# Patient Record
Sex: MALE | Race: OTHER | HISPANIC OR LATINO
[De-identification: names, ages, dates, MRNs, and addresses within clinical notes are randomized per-mention and may not be internally consistent; named-entity substitution may affect disease eponyms.]

---

## 2021-09-08 PROBLEM — Z00.00 ENCOUNTER FOR PREVENTIVE HEALTH EXAMINATION: Status: ACTIVE | Noted: 2021-09-08

## 2021-09-13 ENCOUNTER — APPOINTMENT (OUTPATIENT)
Dept: ORTHOPEDIC SURGERY | Facility: CLINIC | Age: 41
End: 2021-09-13

## 2022-03-21 ENCOUNTER — APPOINTMENT (OUTPATIENT)
Dept: ORTHOPEDIC SURGERY | Facility: CLINIC | Age: 42
End: 2022-03-21
Payer: COMMERCIAL

## 2022-03-21 VITALS
HEART RATE: 59 BPM | WEIGHT: 168 LBS | BODY MASS INDEX: 27 KG/M2 | HEIGHT: 66 IN | DIASTOLIC BLOOD PRESSURE: 80 MMHG | SYSTOLIC BLOOD PRESSURE: 135 MMHG

## 2022-03-21 PROCEDURE — 99205 OFFICE O/P NEW HI 60 MIN: CPT

## 2022-05-09 ENCOUNTER — TRANSCRIPTION ENCOUNTER (OUTPATIENT)
Age: 42
End: 2022-05-09

## 2022-05-09 ENCOUNTER — OUTPATIENT (OUTPATIENT)
Dept: OUTPATIENT SERVICES | Facility: HOSPITAL | Age: 42
LOS: 1 days | End: 2022-05-09
Payer: COMMERCIAL

## 2022-05-09 ENCOUNTER — APPOINTMENT (OUTPATIENT)
Dept: ORTHOPEDIC SURGERY | Facility: CLINIC | Age: 42
End: 2022-05-09
Payer: COMMERCIAL

## 2022-05-09 VITALS
WEIGHT: 169.09 LBS | OXYGEN SATURATION: 99 % | HEIGHT: 67 IN | DIASTOLIC BLOOD PRESSURE: 78 MMHG | SYSTOLIC BLOOD PRESSURE: 119 MMHG | TEMPERATURE: 98 F | HEART RATE: 64 BPM | RESPIRATION RATE: 15 BRPM

## 2022-05-09 DIAGNOSIS — S82.252A DISPLACED COMMINUTED FRACTURE OF SHAFT OF LEFT TIBIA, INITIAL ENCOUNTER FOR CLOSED FRACTURE: ICD-10-CM

## 2022-05-09 DIAGNOSIS — Z87.81 PERSONAL HISTORY OF (HEALED) TRAUMATIC FRACTURE: Chronic | ICD-10-CM

## 2022-05-09 DIAGNOSIS — S82.252K: ICD-10-CM

## 2022-05-09 PROCEDURE — 86901 BLOOD TYPING SEROLOGIC RH(D): CPT

## 2022-05-09 PROCEDURE — 73590 X-RAY EXAM OF LOWER LEG: CPT | Mod: LT

## 2022-05-09 PROCEDURE — 86900 BLOOD TYPING SEROLOGIC ABO: CPT

## 2022-05-09 PROCEDURE — G0463: CPT

## 2022-05-09 PROCEDURE — 99214 OFFICE O/P EST MOD 30 MIN: CPT

## 2022-05-09 PROCEDURE — 86850 RBC ANTIBODY SCREEN: CPT

## 2022-05-09 PROCEDURE — 85027 COMPLETE CBC AUTOMATED: CPT

## 2022-05-09 RX ORDER — CEFAZOLIN SODIUM 1 G
2000 VIAL (EA) INJECTION ONCE
Refills: 0 | Status: DISCONTINUED | OUTPATIENT
Start: 2022-05-10 | End: 2022-05-12

## 2022-05-09 NOTE — H&P PST ADULT - PROBLEM SELECTOR PLAN 1
Removal of hardware left leg  intramedullary nailing left tibia  reconstruction left tibia with autograft

## 2022-05-09 NOTE — H&P PST ADULT - ASSESSMENT
fracture  left tibia   CAPRINI SCORE [CLOT]    AGE RELATED RISK FACTORS                                                       MOBILITY RELATED FACTORS  [ ] Age 41-60 years                                            (1 Point)                  [ ] Bed rest                                                        (1 Point)  [ ] Age: 61-74 years                                           (2 Points)                 [ ] Plaster cast                                                   (2 Points)  [ ] Age= 75 years                                              (3 Points)                 [ ] Bed bound for more than 72 hours                 (2 Points)    DISEASE RELATED RISK FACTORS                                               GENDER SPECIFIC FACTORS  [ ] Edema in the lower extremities                       (1 Point)                  [ ] Pregnancy                                                     (1 Point)  [ ] Varicose veins                                               (1 Point)                  [ ] Post-partum < 6 weeks                                   (1 Point)             [ x] BMI > 25 Kg/m2                                            (1 Point)                  [ ] Hormonal therapy  or oral contraception          (1 Point)                 [ ] Sepsis (in the previous month)                        (1 Point)                  [ ] History of pregnancy complications                 (1 point)  [ ] Pneumonia or serious lung disease                                               [ ] Unexplained or recurrent                     (1 Point)           (in the previous month)                               (1 Point)  [ ] Abnormal pulmonary function test                     (1 Point)                 SURGERY RELATED RISK FACTORS  [ ] Acute myocardial infarction                              (1 Point)                 [ ]  Section                                             (1 Point)  [ ] Congestive heart failure (in the previous month)  (1 Point)               [ ] Minor surgery                                                  (1 Point)   [ ] Inflammatory bowel disease                             (1 Point)                 [ ] Arthroscopic surgery                                        (2 Points)  [ ] Central venous access                                      (2 Points)                [x ] General surgery lasting more than 45 minutes   (2 Points)       [ ] Stroke (in the previous month)                          (5 Points)               [ ] Elective arthroplasty                                         (5 Points)                                                                                                                                               HEMATOLOGY RELATED FACTORS                                                 TRAUMA RELATED RISK FACTORS  [ ] Prior episodes of VTE                                     (3 Points)                [ ] Fracture of the hip, pelvis, or leg                       (5 Points)  [ ] Positive family history for VTE                         (3 Points)                 [ ] Acute spinal cord injury (in the previous month)  (5 Points)  [ ] Prothrombin 23804 A                                     (3 Points)                 [ ] Paralysis  (less than 1 month)                             (5 Points)  [ ] Factor V Leiden                                             (3 Points)                  [ ] Multiple Trauma within 1 month                        (5 Points)  [ ] Lupus anticoagulants                                     (3 Points)                                                           [ ] Anticardiolipin antibodies                               (3 Points)                                                       [ ] High homocysteine in the blood                      (3 Points)                                             [ ] Other congenital or acquired thrombophilia      (3 Points)                                                [ ] Heparin induced thrombocytopenia                  (3 Points)                                          Total Score [   3       ]    Caprini Score 0 - 2:  Low Risk, No VTE Prophylaxis required for most patients, encourage ambulation  Caprini Score 3 - 6:  At Risk, pharmacologic VTE prophylaxis is indicated for most patients (in the absence of a contraindication)  Caprini Score Greater than or = 7:  High Risk, pharmacologic VTE prophylaxis is indicated for most patients (in the absence of a contraindication)

## 2022-05-09 NOTE — H&P PST ADULT - NSANTHOSAYNRD_GEN_A_CORE
No. CYRUS screening performed.  STOP BANG Legend: 0-2 = LOW Risk; 3-4 = INTERMEDIATE Risk; 5-8 = HIGH Risk

## 2022-05-09 NOTE — H&P PST ADULT - FALL HARM RISK - HARM RISK INTERVENTIONS
Communicate Risk of Fall with Harm to all staff/Monitor gait and stability/Reinforce activity limits and safety measures with patient and family/Tailored Fall Risk Interventions/Visual Cue: Yellow wristband and red socks/Bed in lowest position, wheels locked, appropriate side rails in place/Call bell, personal items and telephone in reach/Instruct patient to call for assistance before getting out of bed or chair/Non-slip footwear when patient is out of bed/[place] to call system/Physically safe environment - no spills, clutter or unnecessary equipment/Purposeful Proactive Rounding/Room/bathroom lighting operational, light cord in reach
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
## 2022-05-10 ENCOUNTER — INPATIENT (INPATIENT)
Facility: HOSPITAL | Age: 42
LOS: 1 days | Discharge: HOME | DRG: 493 | End: 2022-05-12
Attending: ORTHOPAEDIC SURGERY | Admitting: ORTHOPAEDIC SURGERY
Payer: COMMERCIAL

## 2022-05-10 ENCOUNTER — APPOINTMENT (OUTPATIENT)
Dept: ORTHOPEDIC SURGERY | Facility: HOSPITAL | Age: 42
End: 2022-05-10

## 2022-05-10 VITALS
DIASTOLIC BLOOD PRESSURE: 80 MMHG | HEIGHT: 67 IN | OXYGEN SATURATION: 98 % | RESPIRATION RATE: 16 BRPM | SYSTOLIC BLOOD PRESSURE: 124 MMHG | HEART RATE: 74 BPM | TEMPERATURE: 97 F | WEIGHT: 315 LBS

## 2022-05-10 DIAGNOSIS — S82.252K: ICD-10-CM

## 2022-05-10 DIAGNOSIS — Z87.81 PERSONAL HISTORY OF (HEALED) TRAUMATIC FRACTURE: Chronic | ICD-10-CM

## 2022-05-10 LAB
ANION GAP SERPL CALC-SCNC: 11 MMOL/L — SIGNIFICANT CHANGE UP (ref 5–17)
BUN SERPL-MCNC: 11 MG/DL — SIGNIFICANT CHANGE UP (ref 7–23)
CALCIUM SERPL-MCNC: 8.7 MG/DL — SIGNIFICANT CHANGE UP (ref 8.4–10.5)
CHLORIDE SERPL-SCNC: 105 MMOL/L — SIGNIFICANT CHANGE UP (ref 96–108)
CO2 SERPL-SCNC: 24 MMOL/L — SIGNIFICANT CHANGE UP (ref 22–31)
CREAT SERPL-MCNC: 1.04 MG/DL — SIGNIFICANT CHANGE UP (ref 0.5–1.3)
EGFR: 94 ML/MIN/1.73M2 — SIGNIFICANT CHANGE UP
GLUCOSE SERPL-MCNC: 139 MG/DL — HIGH (ref 70–99)
GRAM STN FLD: SIGNIFICANT CHANGE UP
GRAM STN FLD: SIGNIFICANT CHANGE UP
HCT VFR BLD CALC: 43.2 % — SIGNIFICANT CHANGE UP (ref 39–50)
HGB BLD-MCNC: 14.6 G/DL — SIGNIFICANT CHANGE UP (ref 13–17)
MCHC RBC-ENTMCNC: 29.9 PG — SIGNIFICANT CHANGE UP (ref 27–34)
MCHC RBC-ENTMCNC: 33.8 GM/DL — SIGNIFICANT CHANGE UP (ref 32–36)
MCV RBC AUTO: 88.3 FL — SIGNIFICANT CHANGE UP (ref 80–100)
NRBC # BLD: 0 /100 WBCS — SIGNIFICANT CHANGE UP (ref 0–0)
PLATELET # BLD AUTO: 229 K/UL — SIGNIFICANT CHANGE UP (ref 150–400)
POTASSIUM SERPL-MCNC: 3.8 MMOL/L — SIGNIFICANT CHANGE UP (ref 3.5–5.3)
POTASSIUM SERPL-SCNC: 3.8 MMOL/L — SIGNIFICANT CHANGE UP (ref 3.5–5.3)
RBC # BLD: 4.89 M/UL — SIGNIFICANT CHANGE UP (ref 4.2–5.8)
RBC # FLD: 12.5 % — SIGNIFICANT CHANGE UP (ref 10.3–14.5)
SARS-COV-2 N GENE NPH QL NAA+PROBE: NOT DETECTED
SODIUM SERPL-SCNC: 140 MMOL/L — SIGNIFICANT CHANGE UP (ref 135–145)
SPECIMEN SOURCE: SIGNIFICANT CHANGE UP
SPECIMEN SOURCE: SIGNIFICANT CHANGE UP
WBC # BLD: 13.3 K/UL — HIGH (ref 3.8–10.5)
WBC # FLD AUTO: 13.3 K/UL — HIGH (ref 3.8–10.5)

## 2022-05-10 PROCEDURE — 27759 TREATMENT OF TIBIA FRACTURE: CPT | Mod: LT

## 2022-05-10 PROCEDURE — 20680 REMOVAL OF IMPLANT DEEP: CPT | Mod: 59,LT

## 2022-05-10 PROCEDURE — 27640 PARTIAL REMOVAL OF TIBIA: CPT | Mod: LT

## 2022-05-10 PROCEDURE — 20702 MNL PREP&INSJ IMED RX DEV: CPT

## 2022-05-10 DEVICE — SCREW LOKG 5X45MM
Type: IMPLANTABLE DEVICE | Site: LEFT | Status: NON-FUNCTIONAL
Removed: 2022-05-10

## 2022-05-10 DEVICE — CEMENT SIMPLEX P 40GM
Type: IMPLANTABLE DEVICE | Site: LEFT | Status: NON-FUNCTIONAL
Removed: 2022-05-10

## 2022-05-10 DEVICE — IMPLANTABLE DEVICE
Type: IMPLANTABLE DEVICE | Site: LEFT | Status: NON-FUNCTIONAL
Removed: 2022-05-10

## 2022-05-10 DEVICE — GUIDEWIRE BALL TIP 3MM X 1000MM FOR T2 R1.5 FEMORAL NAILING SYSTEM
Type: IMPLANTABLE DEVICE | Site: LEFT | Status: NON-FUNCTIONAL
Removed: 2022-05-10

## 2022-05-10 DEVICE — K-WIRE STRYKER 3MM X 285MM
Type: IMPLANTABLE DEVICE | Site: LEFT | Status: NON-FUNCTIONAL
Removed: 2022-05-10

## 2022-05-10 DEVICE — K-WIRE STRYKER SPI .03MM X 285MM
Type: IMPLANTABLE DEVICE | Site: LEFT | Status: NON-FUNCTIONAL
Removed: 2022-05-10

## 2022-05-10 RX ORDER — LIDOCAINE HCL 20 MG/ML
0.2 VIAL (ML) INJECTION ONCE
Refills: 0 | Status: DISCONTINUED | OUTPATIENT
Start: 2022-05-10 | End: 2022-05-10

## 2022-05-10 RX ORDER — ACETAMINOPHEN 500 MG
975 TABLET ORAL EVERY 8 HOURS
Refills: 0 | Status: DISCONTINUED | OUTPATIENT
Start: 2022-05-10 | End: 2022-05-12

## 2022-05-10 RX ORDER — CHLORHEXIDINE GLUCONATE 213 G/1000ML
1 SOLUTION TOPICAL ONCE
Refills: 0 | Status: DISCONTINUED | OUTPATIENT
Start: 2022-05-10 | End: 2022-05-10

## 2022-05-10 RX ORDER — HYDROMORPHONE HYDROCHLORIDE 2 MG/ML
0.5 INJECTION INTRAMUSCULAR; INTRAVENOUS; SUBCUTANEOUS
Refills: 0 | Status: DISCONTINUED | OUTPATIENT
Start: 2022-05-10 | End: 2022-05-10

## 2022-05-10 RX ORDER — MAGNESIUM HYDROXIDE 400 MG/1
30 TABLET, CHEWABLE ORAL DAILY
Refills: 0 | Status: DISCONTINUED | OUTPATIENT
Start: 2022-05-10 | End: 2022-05-12

## 2022-05-10 RX ORDER — CEFAZOLIN SODIUM 1 G
2000 VIAL (EA) INJECTION EVERY 8 HOURS
Refills: 0 | Status: DISCONTINUED | OUTPATIENT
Start: 2022-05-10 | End: 2022-05-12

## 2022-05-10 RX ORDER — CEFAZOLIN SODIUM 1 G
2000 VIAL (EA) INJECTION EVERY 8 HOURS
Refills: 0 | Status: DISCONTINUED | OUTPATIENT
Start: 2022-05-10 | End: 2022-05-10

## 2022-05-10 RX ORDER — OXYCODONE HYDROCHLORIDE 5 MG/1
5 TABLET ORAL
Refills: 0 | Status: DISCONTINUED | OUTPATIENT
Start: 2022-05-10 | End: 2022-05-10

## 2022-05-10 RX ORDER — OXYCODONE HYDROCHLORIDE 5 MG/1
5 TABLET ORAL EVERY 4 HOURS
Refills: 0 | Status: DISCONTINUED | OUTPATIENT
Start: 2022-05-10 | End: 2022-05-12

## 2022-05-10 RX ORDER — POLYETHYLENE GLYCOL 3350 17 G/17G
17 POWDER, FOR SOLUTION ORAL AT BEDTIME
Refills: 0 | Status: DISCONTINUED | OUTPATIENT
Start: 2022-05-10 | End: 2022-05-12

## 2022-05-10 RX ORDER — OXYCODONE HYDROCHLORIDE 5 MG/1
10 TABLET ORAL EVERY 4 HOURS
Refills: 0 | Status: DISCONTINUED | OUTPATIENT
Start: 2022-05-10 | End: 2022-05-12

## 2022-05-10 RX ORDER — SENNA PLUS 8.6 MG/1
2 TABLET ORAL AT BEDTIME
Refills: 0 | Status: DISCONTINUED | OUTPATIENT
Start: 2022-05-10 | End: 2022-05-12

## 2022-05-10 RX ORDER — SODIUM CHLORIDE 9 MG/ML
3 INJECTION INTRAMUSCULAR; INTRAVENOUS; SUBCUTANEOUS EVERY 8 HOURS
Refills: 0 | Status: DISCONTINUED | OUTPATIENT
Start: 2022-05-10 | End: 2022-05-10

## 2022-05-10 RX ORDER — ASPIRIN/CALCIUM CARB/MAGNESIUM 324 MG
325 TABLET ORAL
Refills: 0 | Status: DISCONTINUED | OUTPATIENT
Start: 2022-05-10 | End: 2022-05-12

## 2022-05-10 RX ORDER — SODIUM CHLORIDE 9 MG/ML
1000 INJECTION, SOLUTION INTRAVENOUS
Refills: 0 | Status: DISCONTINUED | OUTPATIENT
Start: 2022-05-10 | End: 2022-05-10

## 2022-05-10 RX ORDER — ONDANSETRON 8 MG/1
4 TABLET, FILM COATED ORAL ONCE
Refills: 0 | Status: DISCONTINUED | OUTPATIENT
Start: 2022-05-10 | End: 2022-05-10

## 2022-05-10 RX ORDER — ONDANSETRON 8 MG/1
4 TABLET, FILM COATED ORAL EVERY 6 HOURS
Refills: 0 | Status: DISCONTINUED | OUTPATIENT
Start: 2022-05-10 | End: 2022-05-12

## 2022-05-10 RX ORDER — OXYCODONE HYDROCHLORIDE 5 MG/1
10 TABLET ORAL
Refills: 0 | Status: DISCONTINUED | OUTPATIENT
Start: 2022-05-10 | End: 2022-05-10

## 2022-05-10 RX ORDER — TRAMADOL HYDROCHLORIDE 50 MG/1
50 TABLET ORAL EVERY 6 HOURS
Refills: 0 | Status: DISCONTINUED | OUTPATIENT
Start: 2022-05-10 | End: 2022-05-12

## 2022-05-10 RX ORDER — KETOROLAC TROMETHAMINE 30 MG/ML
30 SYRINGE (ML) INJECTION EVERY 6 HOURS
Refills: 0 | Status: DISCONTINUED | OUTPATIENT
Start: 2022-05-10 | End: 2022-05-11

## 2022-05-10 RX ORDER — SODIUM CHLORIDE 9 MG/ML
1000 INJECTION, SOLUTION INTRAVENOUS
Refills: 0 | Status: DISCONTINUED | OUTPATIENT
Start: 2022-05-10 | End: 2022-05-12

## 2022-05-10 RX ORDER — PANTOPRAZOLE SODIUM 20 MG/1
40 TABLET, DELAYED RELEASE ORAL
Refills: 0 | Status: DISCONTINUED | OUTPATIENT
Start: 2022-05-10 | End: 2022-05-12

## 2022-05-10 RX ADMIN — Medication 975 MILLIGRAM(S): at 17:10

## 2022-05-10 RX ADMIN — Medication 100 MILLIGRAM(S): at 20:34

## 2022-05-10 RX ADMIN — Medication 325 MILLIGRAM(S): at 17:10

## 2022-05-10 RX ADMIN — HYDROMORPHONE HYDROCHLORIDE 0.5 MILLIGRAM(S): 2 INJECTION INTRAMUSCULAR; INTRAVENOUS; SUBCUTANEOUS at 13:00

## 2022-05-10 RX ADMIN — Medication 1 TABLET(S): at 20:35

## 2022-05-10 RX ADMIN — OXYCODONE HYDROCHLORIDE 10 MILLIGRAM(S): 5 TABLET ORAL at 21:34

## 2022-05-10 RX ADMIN — Medication 975 MILLIGRAM(S): at 17:40

## 2022-05-10 RX ADMIN — OXYCODONE HYDROCHLORIDE 10 MILLIGRAM(S): 5 TABLET ORAL at 20:34

## 2022-05-10 RX ADMIN — Medication 30 MILLIGRAM(S): at 17:11

## 2022-05-10 RX ADMIN — POLYETHYLENE GLYCOL 3350 17 GRAM(S): 17 POWDER, FOR SOLUTION ORAL at 20:35

## 2022-05-10 RX ADMIN — SODIUM CHLORIDE 75 MILLILITER(S): 9 INJECTION, SOLUTION INTRAVENOUS at 13:06

## 2022-05-10 RX ADMIN — Medication 30 MILLIGRAM(S): at 17:40

## 2022-05-10 RX ADMIN — SENNA PLUS 2 TABLET(S): 8.6 TABLET ORAL at 20:35

## 2022-05-10 RX ADMIN — HYDROMORPHONE HYDROCHLORIDE 0.5 MILLIGRAM(S): 2 INJECTION INTRAMUSCULAR; INTRAVENOUS; SUBCUTANEOUS at 13:15

## 2022-05-10 NOTE — CHART NOTE - NSCHARTNOTEFT_GEN_A_CORE
Called by RN that patients dressing was saturated with blood.  Visited patient bedside with family member present.  Patients Heel and posterior left lower extremity mild to moderately saturated with sanguinous drainage. Removed ace bandage, applied new web roll, abdominal pads from the heel up the posterior calf and wrapped in new Ace bandage.  Patient with no compliant and in no acute distress throughout encounter.  Patient and family made aware orthopedic team will continue to monitor dressing daily, patient and family Sinhala speaking.    Gregorio Chávez PA-C  Orthopedic Surgery Team.  Team Pager #[phone]

## 2022-05-10 NOTE — BRIEF OPERATIVE NOTE - NSICDXBRIEFPREOP_GEN_ALL_CORE_FT
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

## 2022-05-10 NOTE — PHYSICAL THERAPY INITIAL EVALUATION ADULT - PLANNED THERAPY INTERVENTIONS, PT EVAL
Stair Training - GOAL: Pt will negotiate one flight of stairs +HR in 2 weeks./balance training/gait training/transfer training

## 2022-05-10 NOTE — PATIENT PROFILE ADULT - FALL HARM RISK - UNIVERSAL INTERVENTIONS
Bed in lowest position, wheels locked, appropriate side rails in place/Call bell, personal items and telephone in reach/Instruct patient to call for assistance before getting out of bed or chair/Non-slip footwear when patient is out of bed/[place] to call system/Physically safe environment - no spills, clutter or unnecessary equipment/Purposeful Proactive Rounding/Room/bathroom lighting operational, light cord in reach

## 2022-05-10 NOTE — BRIEF OPERATIVE NOTE - NSICDXBRIEFPROCEDURE_GEN_ALL_CORE_FT
PROCEDURES:  Sequestrectomy of left tibia 10-May-2022 12:26:48  Nava Salazar  Removal of deeply implanted hardware 10-May-2022 12:26:59  Nava Salazar  Intramedullary nailing of left tibia 10-May-2022 12:27:11 antibiotic coated nail Nava Salazar
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
## 2022-05-10 NOTE — PHYSICAL THERAPY INITIAL EVALUATION ADULT - RANGE OF MOTION EXAMINATION, REHAB EVAL
BUE/BLE PROM WFL/bilateral upper extremity ROM was WFL (within functional limits)/bilateral lower extremity ROM was WFL (within functional limits)

## 2022-05-10 NOTE — CHART NOTE - NSCHARTNOTEFT_GEN_A_CORE
POC    41 year old Moldovan speaking male with history of closed complex fracture of left tibia with nonunion, s/p sequestrectomy of left tibia, removal of implanted hardware, and IMN of left tibia POD#0. Patient was seen laying comfortably in bed, in no acute distress. He states he is comfortable and denies any fever, chills, chest pain, shortness of breath, nausea, vomiting, or any other complaint on review of systems. (DANIELA Chávez present as )     Vital Signs  T(C): 36.4   T(F): 97.5   HR: 77   BP: 129/66  BP(mean): 94   RR: 16   SpO2: 99%     General: Alert and oriented x3, no acute distress  Cardiovascular: RRR, +S1, S2, no murmurs, rubs, or gallops  Pulmonary: Lungs clear to ausculation bilaterally, no wheezes, rales, or rhonchi  Abdomen: Soft, nontender, nondistended, +BS x 4 quadrants  : No butcher in place  Ext:  LLE  Aquacel dressing over left knee, clean, dry, and intact, no drainage noted   Soft dressing over LLE  No calf tenderness or edema   5/5 EHL/FHL  gross sensation intact   DP/PT pulses 2+  Capillary refill <2 seconds    Labs:   N/A    Imaging:   Intraop fluoroscopy s/p removal of hardware, transition to sequestrectomy and IMN ORIF. New hardware in place, no signs of new fracture.     Assessment:   41 year old Moldovan speaking male with history of closed complex fracture of left tibia with nonunion, s/p sequestrectomy of left tibia, removal of implanted hardware, and IMN of left tibia POD#0. Patient is in stable condition.     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 Patient was seen laying comfortably in bed, in no acute distress. He states he is comfortable and denies any fever, chills, chest pain, shortness of breath, nausea, vomiting, or any other complaint on review of systems. (DANIELA Chávez present as )     Vital Signs  T(C): 36.4   T(F): 97.5   HR: 77   BP: 129/66  BP(mean): 94   RR: 16   SpO2: 99%     General: Alert and oriented x3, no acute distress  Cardiovascular: RRR, +S1, S2, no murmurs, rubs, or gallops  Pulmonary: Lungs clear to ausculation bilaterally, no wheezes, rales, or rhonchi  Abdomen: Soft, nontender, nondistended, +BS x 4 quadrants  : No butcher in place  Ext:  LLE  Aquacel dressing over left knee, clean, dry, and intact, no drainage noted   Soft dressing over LLE  No calf tenderness or edema   5/5 EHL/FHL  gross sensation intact throughout digits 1-5  DP/PT pulses 2+  Capillary refill <2 seconds    Labs:                         14.6   13.30 )-----------( 229      ( 10 May 2022 12:40 )             43.2       05-10    140  |  105  |  11  ----------------------------<  139<H>  3.8   |  24  |  1.04            Imaging:   Intra-op fluoroscopy: s/p removal of hardware, transition to sequestrectomy and IMN ORIF. New hardware in place, no signs of new fracture.     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 Patient is in stable condition and in no acute distress.    Plan:   F/U AM Labs tomorrow    Pain control: Tylenol 975 mg PO Q8, Toradol 30 mg IV Q6 x 4 doses, Tramadol 50mg PO Q6h for mild pain, Oxycodone 5 mg PO Q3 PRN for moderate pain, Oxycodone 10 mg PO Q3 PRN for severe pain.   PT: LLE; Weight bearing as tolerated with crutches, ROM knee/ankle as tolerated  DVT prophylaxis: Aspirin 325 mg PO BID   F/U OR cultures  Continue Ancef until OR cultures finalize   Dispo: PACU to floor  Monitor patient daily    Alley SAHU  Orthopedic Service   Beeper [phone]

## 2022-05-10 NOTE — PHYSICAL THERAPY INITIAL EVALUATION ADULT - GENERAL OBSERVATIONS, REHAB EVAL
Received semisupine +IVL, +ace wrap to LLE, VSS. Pt A&OX4, follows 100% of commands. +orthostatic hypotension.

## 2022-05-10 NOTE — BRIEF OPERATIVE NOTE - NSICDXBRIEFPOSTOP_GEN_ALL_CORE_FT
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

## 2022-05-11 ENCOUNTER — TRANSCRIPTION ENCOUNTER (OUTPATIENT)
Age: 42
End: 2022-05-11

## 2022-05-11 LAB
ANION GAP SERPL CALC-SCNC: 10 MMOL/L — SIGNIFICANT CHANGE UP (ref 5–17)
BUN SERPL-MCNC: 18 MG/DL — SIGNIFICANT CHANGE UP (ref 7–23)
CALCIUM SERPL-MCNC: 8.6 MG/DL — SIGNIFICANT CHANGE UP (ref 8.4–10.5)
CHLORIDE SERPL-SCNC: 105 MMOL/L — SIGNIFICANT CHANGE UP (ref 96–108)
CO2 SERPL-SCNC: 25 MMOL/L — SIGNIFICANT CHANGE UP (ref 22–31)
CREAT SERPL-MCNC: 0.93 MG/DL — SIGNIFICANT CHANGE UP (ref 0.5–1.3)
EGFR: 107 ML/MIN/1.73M2 — SIGNIFICANT CHANGE UP
GLUCOSE SERPL-MCNC: 98 MG/DL — SIGNIFICANT CHANGE UP (ref 70–99)
HCT VFR BLD CALC: 33.7 % — LOW (ref 39–50)
HGB BLD-MCNC: 11.4 G/DL — LOW (ref 13–17)
MCHC RBC-ENTMCNC: 29.9 PG — SIGNIFICANT CHANGE UP (ref 27–34)
MCHC RBC-ENTMCNC: 33.8 GM/DL — SIGNIFICANT CHANGE UP (ref 32–36)
MCV RBC AUTO: 88.5 FL — SIGNIFICANT CHANGE UP (ref 80–100)
NRBC # BLD: 0 /100 WBCS — SIGNIFICANT CHANGE UP (ref 0–0)
PLATELET # BLD AUTO: 195 K/UL — SIGNIFICANT CHANGE UP (ref 150–400)
POTASSIUM SERPL-MCNC: 3.6 MMOL/L — SIGNIFICANT CHANGE UP (ref 3.5–5.3)
POTASSIUM SERPL-SCNC: 3.6 MMOL/L — SIGNIFICANT CHANGE UP (ref 3.5–5.3)
RBC # BLD: 3.81 M/UL — LOW (ref 4.2–5.8)
RBC # FLD: 12.5 % — SIGNIFICANT CHANGE UP (ref 10.3–14.5)
SODIUM SERPL-SCNC: 140 MMOL/L — SIGNIFICANT CHANGE UP (ref 135–145)
WBC # BLD: 12.97 K/UL — HIGH (ref 3.8–10.5)
WBC # FLD AUTO: 12.97 K/UL — HIGH (ref 3.8–10.5)

## 2022-05-11 RX ADMIN — Medication 325 MILLIGRAM(S): at 05:01

## 2022-05-11 RX ADMIN — Medication 30 MILLIGRAM(S): at 05:01

## 2022-05-11 RX ADMIN — Medication 975 MILLIGRAM(S): at 00:34

## 2022-05-11 RX ADMIN — POLYETHYLENE GLYCOL 3350 17 GRAM(S): 17 POWDER, FOR SOLUTION ORAL at 20:50

## 2022-05-11 RX ADMIN — Medication 30 MILLIGRAM(S): at 00:34

## 2022-05-11 RX ADMIN — Medication 1 TABLET(S): at 14:10

## 2022-05-11 RX ADMIN — Medication 1 TABLET(S): at 12:12

## 2022-05-11 RX ADMIN — Medication 1 TABLET(S): at 05:01

## 2022-05-11 RX ADMIN — Medication 100 MILLIGRAM(S): at 20:49

## 2022-05-11 RX ADMIN — PANTOPRAZOLE SODIUM 40 MILLIGRAM(S): 20 TABLET, DELAYED RELEASE ORAL at 05:01

## 2022-05-11 RX ADMIN — Medication 1 TABLET(S): at 20:49

## 2022-05-11 RX ADMIN — Medication 975 MILLIGRAM(S): at 09:11

## 2022-05-11 RX ADMIN — Medication 975 MILLIGRAM(S): at 10:11

## 2022-05-11 RX ADMIN — OXYCODONE HYDROCHLORIDE 5 MILLIGRAM(S): 5 TABLET ORAL at 20:49

## 2022-05-11 RX ADMIN — Medication 100 MILLIGRAM(S): at 14:11

## 2022-05-11 RX ADMIN — Medication 30 MILLIGRAM(S): at 12:40

## 2022-05-11 RX ADMIN — Medication 325 MILLIGRAM(S): at 17:39

## 2022-05-11 RX ADMIN — Medication 100 MILLIGRAM(S): at 05:01

## 2022-05-11 RX ADMIN — Medication 975 MILLIGRAM(S): at 18:40

## 2022-05-11 RX ADMIN — OXYCODONE HYDROCHLORIDE 5 MILLIGRAM(S): 5 TABLET ORAL at 21:49

## 2022-05-11 RX ADMIN — Medication 30 MILLIGRAM(S): at 12:12

## 2022-05-11 RX ADMIN — Medication 975 MILLIGRAM(S): at 17:39

## 2022-05-11 NOTE — PROGRESS NOTE ADULT - ATTENDING COMMENTS
s/p Stage 1 nonunion reconstruction.  f/u cx.  If cx remain -ve pt can be dc tomorrow.  OOB. WBAt w crutches

## 2022-05-11 NOTE — DISCHARGE NOTE PROVIDER - NSDCMRMEDTOKEN_GEN_ALL_CORE_FT
acetaminophen 325 mg oral tablet: 3 tab(s) orally every 8 hours x 5 days then as needed  calcium-vitamin D 500 mg-5 mcg (200 intl units) oral tablet: 1 tab(s) orally 3 times a day  Ecotrin 325 mg oral delayed release tablet: 1 tab(s) orally 2 times a day x 6 weeks MDD:2  Multiple Vitamins oral tablet: 1 tab(s) orally once a day  oxyCODONE 5 mg oral tablet: 1 or 2  tab(s) orally every 4 hours, as needed for moderate to severe pain MDD:6  pantoprazole 40 mg oral delayed release tablet: 1 tab(s) orally once a day (before a meal) MDD:1  polyethylene glycol 3350 oral powder for reconstitution: 17 gram(s) orally once a day (at bedtime), As Needed  senna oral tablet: 2 tab(s) orally once a day (at bedtime)  traMADol 50 mg oral tablet: 1 tab(s) orally every 6 hours, As needed, Mild Pain (1 - 3) MDD:4
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
## 2022-05-11 NOTE — OCCUPATIONAL THERAPY INITIAL EVALUATION ADULT - LONG TERM MEMORY, REHAB EVAL
[Normal Breath Sounds] : Normal breath sounds [Normal Heart Sounds] : normal heart sounds [Normal Rate and Rhythm] : normal rate and rhythm [No Rash or Lesion] : No rash or lesion [Alert] : alert [Oriented to Person] : oriented to person [Oriented to Place] : oriented to place [Oriented to Time] : oriented to time [Calm] : calm [de-identified] : Obese in no distress [de-identified] : SARITA CAMERON EOMI [de-identified] : Obese, soft, nontender, nondistended, positive bowel sounds in all four quadrants.  No hernia or masses.  Minimal RLQ tenderness.  Significantly improved from time of hospitalization. [de-identified] : Ambulating without difficulty or assistance intact

## 2022-05-11 NOTE — DISCHARGE NOTE PROVIDER - NSDCACTIVITY_GEN_ALL_CORE
Do not drive or operate machinery/Do not make important decisions/Walking - Indoors allowed/No heavy lifting/straining/Walking - Outdoors allowed Do not drive or operate machinery/Do not make important decisions/Stairs allowed/Walking - Indoors allowed/No heavy lifting/straining/Walking - Outdoors allowed

## 2022-05-11 NOTE — PHYSICAL EXAM
[de-identified] : L tibia \par Incisions CDI healed no erythema drainage or induration \par SILT TN SPN DPN SN TTP over medial tibial shaft \par Knee ROM 0-135 degrees \par ankle ROM 10 dorsiflexion to 20 degrees plantarflexion \par 30 inversion 5 degrees eversion \par 4/5 strength quads TA PT GS EHL FHL peroneals \par NVI distally 2+ distal pulses  [de-identified] : CT scan 2/22/22 reviewed- There is minimal bone formation medial fracture site with continued persistent fracture lines. There is some callus formation laterally. There is a healed fibula fracture \par 3 views L ankle 2 views left tibia taken today and reviewed by me- There  continues to be some callus formation on 2/4 corticies of the tibia. There is a healed fibula fracture with callus formation.  There has been minimal to no progression of callus compared to prior radiographs with persistent fracture lines.

## 2022-05-11 NOTE — DISCHARGE NOTE PROVIDER - NSDCCPCAREPLAN_GEN_ALL_CORE_FT
PRINCIPAL DISCHARGE DIAGNOSIS  Diagnosis: Displaced comminuted fracture of shaft of left tibia  Assessment and Plan of Treatment:

## 2022-05-11 NOTE — REASON FOR VISIT
[Follow-Up Visit] : a follow-up visit for [Time Spent: ____ minutes] : Total time spent using  services: [unfilled] minutes. The patient's primary language is not English thus required  services. [FreeTextEntry2] : left tibia fracture [Interpreters_IDNumber] : [id number] [Interpreters_FullName] : Colin [TWNoteComboBox1] : Sudanese

## 2022-05-11 NOTE — DISCHARGE NOTE PROVIDER - CARE PROVIDER_API CALL
Reese Leary (MD)  Orthopaedic Surgery  [address]  [place] [address]  Phone: [phone]  Fax: [phone]  Follow Up Time:

## 2022-05-11 NOTE — OCCUPATIONAL THERAPY INITIAL EVALUATION ADULT - PERTINENT HX OF CURRENT PROBLEM, REHAB EVAL
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

## 2022-05-11 NOTE — DISCHARGE NOTE PROVIDER - NSDCCPTREATMENT_GEN_ALL_CORE_FT
PRINCIPAL PROCEDURE  Procedure: Removal of deeply implanted hardware  Findings and Treatment:       SECONDARY PROCEDURE  Procedure: Sequestrectomy of left tibia  Findings and Treatment:
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
## 2022-05-11 NOTE — DISCHARGE NOTE PROVIDER - NSDCFUADDINST_GEN_ALL_CORE_FT
Continue weight bearing as tolerated protected ambulation, keep dressings clean and dry, follow up with Dr. Leary in his office post operative day #14 (5/24/2022) for wound check and suture removal.
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
## 2022-05-12 ENCOUNTER — TRANSCRIPTION ENCOUNTER (OUTPATIENT)
Age: 42
End: 2022-05-12

## 2022-05-12 VITALS
TEMPERATURE: 98 F | SYSTOLIC BLOOD PRESSURE: 113 MMHG | HEART RATE: 81 BPM | RESPIRATION RATE: 18 BRPM | DIASTOLIC BLOOD PRESSURE: 70 MMHG | OXYGEN SATURATION: 96 %

## 2022-05-12 PROCEDURE — 87070 CULTURE OTHR SPECIMN AEROBIC: CPT

## 2022-05-12 PROCEDURE — C1713: CPT

## 2022-05-12 PROCEDURE — 80048 BASIC METABOLIC PNL TOTAL CA: CPT

## 2022-05-12 PROCEDURE — 97116 GAIT TRAINING THERAPY: CPT

## 2022-05-12 PROCEDURE — 97530 THERAPEUTIC ACTIVITIES: CPT

## 2022-05-12 PROCEDURE — 85027 COMPLETE CBC AUTOMATED: CPT

## 2022-05-12 PROCEDURE — C1769: CPT

## 2022-05-12 PROCEDURE — C9399: CPT

## 2022-05-12 PROCEDURE — 76000 FLUOROSCOPY <1 HR PHYS/QHP: CPT

## 2022-05-12 PROCEDURE — 97161 PT EVAL LOW COMPLEX 20 MIN: CPT

## 2022-05-12 PROCEDURE — 36415 COLL VENOUS BLD VENIPUNCTURE: CPT

## 2022-05-12 PROCEDURE — 87075 CULTR BACTERIA EXCEPT BLOOD: CPT

## 2022-05-12 PROCEDURE — 97165 OT EVAL LOW COMPLEX 30 MIN: CPT

## 2022-05-12 RX ORDER — POLYETHYLENE GLYCOL 3350 17 G/17G
17 POWDER, FOR SOLUTION ORAL
Qty: 0 | Refills: 0 | DISCHARGE
Start: 2022-05-12

## 2022-05-12 RX ORDER — TRAMADOL HYDROCHLORIDE 50 MG/1
1 TABLET ORAL
Qty: 28 | Refills: 0
Start: 2022-05-12 | End: 2022-05-18

## 2022-05-12 RX ORDER — SENNA PLUS 8.6 MG/1
2 TABLET ORAL
Qty: 0 | Refills: 0 | DISCHARGE
Start: 2022-05-12

## 2022-05-12 RX ORDER — OXYCODONE HYDROCHLORIDE 5 MG/1
1 TABLET ORAL
Qty: 40 | Refills: 0
Start: 2022-05-12

## 2022-05-12 RX ORDER — ACETAMINOPHEN 500 MG
3 TABLET ORAL
Qty: 0 | Refills: 0 | DISCHARGE
Start: 2022-05-12

## 2022-05-12 RX ORDER — PANTOPRAZOLE SODIUM 20 MG/1
1 TABLET, DELAYED RELEASE ORAL
Qty: 30 | Refills: 0
Start: 2022-05-12 | End: 2022-06-10

## 2022-05-12 RX ORDER — ASPIRIN/CALCIUM CARB/MAGNESIUM 324 MG
1 TABLET ORAL
Qty: 82 | Refills: 0
Start: 2022-05-12 | End: 2022-06-21

## 2022-05-12 RX ADMIN — Medication 100 MILLIGRAM(S): at 05:54

## 2022-05-12 RX ADMIN — OXYCODONE HYDROCHLORIDE 5 MILLIGRAM(S): 5 TABLET ORAL at 06:55

## 2022-05-12 RX ADMIN — OXYCODONE HYDROCHLORIDE 5 MILLIGRAM(S): 5 TABLET ORAL at 05:55

## 2022-05-12 RX ADMIN — Medication 975 MILLIGRAM(S): at 08:22

## 2022-05-12 RX ADMIN — Medication 1 TABLET(S): at 11:31

## 2022-05-12 RX ADMIN — Medication 1 TABLET(S): at 05:53

## 2022-05-12 RX ADMIN — PANTOPRAZOLE SODIUM 40 MILLIGRAM(S): 20 TABLET, DELAYED RELEASE ORAL at 05:53

## 2022-05-12 RX ADMIN — Medication 325 MILLIGRAM(S): at 05:54

## 2022-05-12 NOTE — PROGRESS NOTE ADULT - ASSESSMENT
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

## 2022-05-12 NOTE — DISCHARGE NOTE NURSING/CASE MANAGEMENT/SOCIAL WORK - PATIENT PORTAL LINK FT
You can access the FollowMyHealth Patient Portal offered by [facility] by registering at the following website: [email or web address]. By joining [facility]’s FollowMyHealth portal, you will also be able to view your health information using other applications (apps) compatible with our system.

## 2022-05-12 NOTE — DISCHARGE NOTE NURSING/CASE MANAGEMENT/SOCIAL WORK - NSDCPEFALRISK_GEN_ALL_CORE
For information on Fall & Injury Prevention, visit: [email or web address] OR  [email or web address] OR  https://www.cdc.gov/steadi/patient.html

## 2022-05-12 NOTE — PROGRESS NOTE ADULT - ATTENDING COMMENTS
s/p Stage 1 nonunion reconstruction.  f/u cx.  If cx remain -ve pt can be dc tomorrow.  OOB. WBAt w crutches s/p Stage 1 nonunion reconstruction.  cx continue to be -ve.  DC home

## 2022-05-12 NOTE — PROGRESS NOTE ADULT - SUBJECTIVE AND OBJECTIVE BOX
ORTHO ATTENDING POST OP    s/p SALVATORE,  IM nail L  tibia with antibiotic coated nail  WBAT L LE w crutches  ROM knee/ankle as tolerated  Ancef 1g standing until cx return   BID  venodynes  CBC in RR and AM  f/u cx x 4  PT consult- OOB  elevation  keep patient admitted until 5/12 pending cx results.  f/u 2 weeks  
Orthopedic Surgery Progress Note  S: Pain is well controlled.  No events overnight. Denies numbness/tingling. Patient appears very comfortable this morning.    O:  Vital Signs Last 24 Hrs  T(C): 36.6 (11 May 2022 04:20), Max: 36.7 (10 May 2022 14:46)  T(F): 97.9 (11 May 2022 04:20), Max: 98.1 (10 May 2022 14:46)  HR: 66 (11 May 2022 04:20) (54 - 97)  BP: 102/61 (11 May 2022 04:20) (102/61 - 144/71)  BP(mean): 94 (10 May 2022 14:15) (86 - 101)  RR: 18 (11 May 2022 04:20) (12 - 18)  SpO2: 98% (11 May 2022 04:20) (95% - 100%)    Gen: NAD  LLE  Dressings clean, dry, intact  fires EHL/TA/GS  no pain with passive dorsiflexion or plantarflexion  SILT at toes  WWP distally with brisk cap refill                        14.6   13.30 )-----------( 229      ( 10 May 2022 12:40 )             43.2                         15.6   8.20  )-----------( 261      ( 09 May 2022 18:28 )             47.2     05-10    140  |  105  |  11  ----------------------------<  139<H>  3.8   |  24  |  1.04      Assessment:   41 year old male with history of 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
Patient seen and examined at bedside, resting comfortably. Pain well controlled. Denies headache/dizziness/lightheadedness, chest pain, dyspnea, numbness/tingling. No complaints at this time. No overnight events.      LABS:                        11.4   12.97 )-----------( 195      ( 11 May 2022 06:49 )             33.7     05-11    140  |  105  |  18  ----------------------------<  98  3.6   |  25  |  0.93    Ca    8.6      11 May 2022 06:48            VITAL SIGNS:  T(C): 36.8 (05-12-22 @ 04:05), Max: 37.1 (05-11-22 @ 23:35)  HR: 69 (05-12-22 @ 04:05) (59 - 85)  BP: 105/67 (05-12-22 @ 04:05) (102/58 - 123/71)  RR: 18 (05-12-22 @ 04:05) (18 - 18)  SpO2: 97% (05-12-22 @ 04:05) (97% - 100%)      Gen: NAD  LLE  Dressings clean, dry, intact  fires EHL/TA/GS/FHL  No pain with passive dorsiflexion or plantarflexion  SILT at toes  WWP distally with brisk cap refill

## 2022-05-14 PROBLEM — S82.252A DISPLACED COMMINUTED FRACTURE OF SHAFT OF LEFT TIBIA, INITIAL ENCOUNTER FOR CLOSED FRACTURE: Chronic | Status: ACTIVE | Noted: 2022-05-09

## 2022-05-14 PROBLEM — Z78.9 OTHER SPECIFIED HEALTH STATUS: Chronic | Status: ACTIVE | Noted: 2022-05-09

## 2022-05-15 LAB
CULTURE RESULTS: SIGNIFICANT CHANGE UP
SPECIMEN SOURCE: SIGNIFICANT CHANGE UP

## 2022-05-23 ENCOUNTER — APPOINTMENT (OUTPATIENT)
Dept: ORTHOPEDIC SURGERY | Facility: CLINIC | Age: 42
End: 2022-05-23
Payer: COMMERCIAL

## 2022-05-23 VITALS
SYSTOLIC BLOOD PRESSURE: 123 MMHG | BODY MASS INDEX: 28.28 KG/M2 | WEIGHT: 176 LBS | HEART RATE: 73 BPM | DIASTOLIC BLOOD PRESSURE: 71 MMHG | HEIGHT: 66 IN

## 2022-05-23 PROCEDURE — 99024 POSTOP FOLLOW-UP VISIT: CPT

## 2022-05-26 ENCOUNTER — OUTPATIENT (OUTPATIENT)
Dept: OUTPATIENT SERVICES | Facility: HOSPITAL | Age: 42
LOS: 1 days | End: 2022-05-26
Payer: COMMERCIAL

## 2022-05-26 VITALS
RESPIRATION RATE: 14 BRPM | TEMPERATURE: 97 F | OXYGEN SATURATION: 96 % | HEIGHT: 66 IN | DIASTOLIC BLOOD PRESSURE: 75 MMHG | HEART RATE: 69 BPM | SYSTOLIC BLOOD PRESSURE: 116 MMHG | WEIGHT: 166.01 LBS

## 2022-05-26 DIAGNOSIS — Z01.818 ENCOUNTER FOR OTHER PREPROCEDURAL EXAMINATION: ICD-10-CM

## 2022-05-26 DIAGNOSIS — Z29.9 ENCOUNTER FOR PROPHYLACTIC MEASURES, UNSPECIFIED: ICD-10-CM

## 2022-05-26 DIAGNOSIS — S82.252A DISPLACED COMMINUTED FRACTURE OF SHAFT OF LEFT TIBIA, INITIAL ENCOUNTER FOR CLOSED FRACTURE: ICD-10-CM

## 2022-05-26 DIAGNOSIS — S82.252K: ICD-10-CM

## 2022-05-26 DIAGNOSIS — Z87.81 PERSONAL HISTORY OF (HEALED) TRAUMATIC FRACTURE: Chronic | ICD-10-CM

## 2022-05-26 LAB
ANION GAP SERPL CALC-SCNC: 12 MMOL/L — SIGNIFICANT CHANGE UP (ref 5–17)
BUN SERPL-MCNC: 13 MG/DL — SIGNIFICANT CHANGE UP (ref 7–23)
CALCIUM SERPL-MCNC: 9.6 MG/DL — SIGNIFICANT CHANGE UP (ref 8.4–10.5)
CHLORIDE SERPL-SCNC: 104 MMOL/L — SIGNIFICANT CHANGE UP (ref 96–108)
CO2 SERPL-SCNC: 27 MMOL/L — SIGNIFICANT CHANGE UP (ref 22–31)
CREAT SERPL-MCNC: 1.03 MG/DL — SIGNIFICANT CHANGE UP (ref 0.5–1.3)
EGFR: 95 ML/MIN/1.73M2 — SIGNIFICANT CHANGE UP
GLUCOSE SERPL-MCNC: 85 MG/DL — SIGNIFICANT CHANGE UP (ref 70–99)
HCT VFR BLD CALC: 39.3 % — SIGNIFICANT CHANGE UP (ref 39–50)
HGB BLD-MCNC: 13 G/DL — SIGNIFICANT CHANGE UP (ref 13–17)
MCHC RBC-ENTMCNC: 29.6 PG — SIGNIFICANT CHANGE UP (ref 27–34)
MCHC RBC-ENTMCNC: 33.1 GM/DL — SIGNIFICANT CHANGE UP (ref 32–36)
MCV RBC AUTO: 89.5 FL — SIGNIFICANT CHANGE UP (ref 80–100)
NRBC # BLD: 0 /100 WBCS — SIGNIFICANT CHANGE UP (ref 0–0)
PLATELET # BLD AUTO: 404 K/UL — HIGH (ref 150–400)
POTASSIUM SERPL-MCNC: 3.7 MMOL/L — SIGNIFICANT CHANGE UP (ref 3.5–5.3)
POTASSIUM SERPL-SCNC: 3.7 MMOL/L — SIGNIFICANT CHANGE UP (ref 3.5–5.3)
RBC # BLD: 4.39 M/UL — SIGNIFICANT CHANGE UP (ref 4.2–5.8)
RBC # FLD: 12.3 % — SIGNIFICANT CHANGE UP (ref 10.3–14.5)
SODIUM SERPL-SCNC: 143 MMOL/L — SIGNIFICANT CHANGE UP (ref 135–145)
WBC # BLD: 7.41 K/UL — SIGNIFICANT CHANGE UP (ref 3.8–10.5)
WBC # FLD AUTO: 7.41 K/UL — SIGNIFICANT CHANGE UP (ref 3.8–10.5)

## 2022-05-26 PROCEDURE — 80048 BASIC METABOLIC PNL TOTAL CA: CPT

## 2022-05-26 PROCEDURE — 85027 COMPLETE CBC AUTOMATED: CPT

## 2022-05-26 PROCEDURE — G0463: CPT

## 2022-05-26 RX ORDER — CEFAZOLIN SODIUM 1 G
2000 VIAL (EA) INJECTION ONCE
Refills: 0 | Status: DISCONTINUED | OUTPATIENT
Start: 2022-05-31 | End: 2022-05-31
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
## 2022-05-26 NOTE — H&P PST ADULT - PROBLEM SELECTOR PLAN 1
Removal of hardware left leg  intramedullary nailing left tibia  reconstruction left tibia with autograft Removal of intramedullary nailing left tibia  removal of ABX spacer  reconstruction left tibia nonunion  Intramedullary fixation left tibia
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
## 2022-05-26 NOTE — H&P PST ADULT - FALL HARM RISK - HARM RISK INTERVENTIONS

## 2022-05-26 NOTE — H&P PST ADULT - ASSESSMENT
fracture  left tibia   CAPRINI SCORE [CLOT]    AGE RELATED RISK FACTORS                                                       MOBILITY RELATED FACTORS  [ ] Age 41-60 years                                            (1 Point)                  [ ] Bed rest                                                        (1 Point)  [ ] Age: 61-74 years                                           (2 Points)                 [ ] Plaster cast                                                   (2 Points)  [ ] Age= 75 years                                              (3 Points)                 [ ] Bed bound for more than 72 hours                 (2 Points)    DISEASE RELATED RISK FACTORS                                               GENDER SPECIFIC FACTORS  [ ] Edema in the lower extremities                       (1 Point)                  [ ] Pregnancy                                                     (1 Point)  [ ] Varicose veins                                               (1 Point)                  [ ] Post-partum < 6 weeks                                   (1 Point)             [ x] BMI > 25 Kg/m2                                            (1 Point)                  [ ] Hormonal therapy  or oral contraception          (1 Point)                 [ ] Sepsis (in the previous month)                        (1 Point)                  [ ] History of pregnancy complications                 (1 point)  [ ] Pneumonia or serious lung disease                                               [ ] Unexplained or recurrent                     (1 Point)           (in the previous month)                               (1 Point)  [ ] Abnormal pulmonary function test                     (1 Point)                 SURGERY RELATED RISK FACTORS  [ ] Acute myocardial infarction                              (1 Point)                 [ ]  Section                                             (1 Point)  [ ] Congestive heart failure (in the previous month)  (1 Point)               [ ] Minor surgery                                                  (1 Point)   [ ] Inflammatory bowel disease                             (1 Point)                 [ ] Arthroscopic surgery                                        (2 Points)  [ ] Central venous access                                      (2 Points)                [x ] General surgery lasting more than 45 minutes   (2 Points)       [ ] Stroke (in the previous month)                          (5 Points)               [ ] Elective arthroplasty                                         (5 Points)                                                                                                                                               HEMATOLOGY RELATED FACTORS                                                 TRAUMA RELATED RISK FACTORS  [ ] Prior episodes of VTE                                     (3 Points)                [ ] Fracture of the hip, pelvis, or leg                       (5 Points)  [ ] Positive family history for VTE                         (3 Points)                 [ ] Acute spinal cord injury (in the previous month)  (5 Points)  [ ] Prothrombin 36681 A                                     (3 Points)                 [ ] Paralysis  (less than 1 month)                             (5 Points)  [ ] Factor V Leiden                                             (3 Points)                  [ ] Multiple Trauma within 1 month                        (5 Points)  [ ] Lupus anticoagulants                                     (3 Points)                                                           [ ] Anticardiolipin antibodies                               (3 Points)                                                       [ ] High homocysteine in the blood                      (3 Points)                                             [ ] Other congenital or acquired thrombophilia      (3 Points)                                                [ ] Heparin induced thrombocytopenia                  (3 Points)                                          Total Score [   3       ]    Caprini Score 0 - 2:  Low Risk, No VTE Prophylaxis required for most patients, encourage ambulation  Caprini Score 3 - 6:  At Risk, pharmacologic VTE prophylaxis is indicated for most patients (in the absence of a contraindication)  Caprini Score Greater than or = 7:  High Risk, pharmacologic VTE prophylaxis is indicated for most patients (in the absence of a contraindication)

## 2022-05-26 NOTE — H&P PST ADULT - ATTENDING COMMENTS
L tibia nonunion.  For staged reconstruction.  SALVATORE and sequestrectomy today.  F/u cx post op For repair L tibia non union w autograft and alograft.  r/b/a discussed

## 2022-05-26 NOTE — H&P PST ADULT - MS GEN HX ROS MEA POS PC
arthritis/joint pain/stiffness/leg pain L mild left shin pain, does not take pain medications/arthritis/joint pain/stiffness/leg pain L

## 2022-05-27 NOTE — HISTORY OF PRESENT ILLNESS
[de-identified] : S/P removal of hardware, sequestrectomy and placement of antibiotic intramedullary nail left tibia 5/10/22 [de-identified] : 32 yo M who [place] open [place] fracture [place] [facility] [place] June [place]. prior the procedure it was explained to the patient that this would be a staged procedure. He is now  S/P removal of hardware, sequestrectomy and placement of antibiotic intramedullary nail left tibia 5/10/22. He presents today for first post op followup. Cultures at the time of surgery were negative for infection. He is doing well post operatively.  [de-identified] : LT tibia \par Incisions CDI healing well with sutures in place no erythema drainage or induration \par SILT TN SPN DPN SN \par able to move knee ankle foot and toes \par NVI distally  [de-identified] : none  [de-identified] : S/P removal of hardware, sequestrectomy and placement of antibiotic intramedullary nail left tibia 5/10/22\par - cultures negative \par - cleared to undergo reconstruction of his tibia nonunion  [de-identified] : S/P removal of hardware, sequestrectomy and placement of antibiotic intramedullary nail left tibia 5/10/22\par - cultures were negative \par We will plan for stage two removal of hardware and reconstruction of tibia nonunion with auto/allograft. \par Benefits and alternatives of the procedure discussed with the patient in detail. Risks including but not limited to bleeding, infection, damage to nerves, damage to tissues, damage to blood vessels, persistent nonunion, malunion, post surgical stiffness, need for future surgery, DVT, PE, loss of limb and loss of life were explained.  Benefits of surgery include anatomic restoration  of length alignment and rotation, healing of nonunion, and best chance for better long term functional outcomes. The patient understands the risks and benefits and wishes to undergo surgery. He will be scheduled appropriately.\par

## 2022-05-28 ENCOUNTER — OUTPATIENT (OUTPATIENT)
Dept: OUTPATIENT SERVICES | Facility: HOSPITAL | Age: 42
LOS: 1 days | End: 2022-05-28
Payer: COMMERCIAL

## 2022-05-28 DIAGNOSIS — Z11.52 ENCOUNTER FOR SCREENING FOR COVID-19: ICD-10-CM

## 2022-05-28 DIAGNOSIS — Z87.81 PERSONAL HISTORY OF (HEALED) TRAUMATIC FRACTURE: Chronic | ICD-10-CM

## 2022-05-28 LAB — SARS-COV-2 RNA SPEC QL NAA+PROBE: SIGNIFICANT CHANGE UP

## 2022-05-28 PROCEDURE — C9803: CPT

## 2022-05-28 PROCEDURE — U0005: CPT

## 2022-05-28 PROCEDURE — U0003: CPT

## 2022-05-30 ENCOUNTER — TRANSCRIPTION ENCOUNTER (OUTPATIENT)
Age: 42
End: 2022-05-30

## 2022-05-31 ENCOUNTER — INPATIENT (INPATIENT)
Facility: HOSPITAL | Age: 42
LOS: 0 days | Discharge: HOME | DRG: 494 | End: 2022-05-31
Attending: ORTHOPAEDIC SURGERY | Admitting: ORTHOPAEDIC SURGERY
Payer: COMMERCIAL

## 2022-05-31 ENCOUNTER — APPOINTMENT (OUTPATIENT)
Dept: ORTHOPEDIC SURGERY | Facility: HOSPITAL | Age: 42
End: 2022-05-31

## 2022-05-31 ENCOUNTER — TRANSCRIPTION ENCOUNTER (OUTPATIENT)
Age: 42
End: 2022-05-31

## 2022-05-31 VITALS
DIASTOLIC BLOOD PRESSURE: 71 MMHG | OXYGEN SATURATION: 98 % | RESPIRATION RATE: 18 BRPM | SYSTOLIC BLOOD PRESSURE: 106 MMHG | HEIGHT: 66 IN | HEART RATE: 69 BPM | WEIGHT: 166.01 LBS | TEMPERATURE: 98 F

## 2022-05-31 VITALS
SYSTOLIC BLOOD PRESSURE: 100 MMHG | HEART RATE: 75 BPM | OXYGEN SATURATION: 100 % | TEMPERATURE: 98 F | RESPIRATION RATE: 16 BRPM | DIASTOLIC BLOOD PRESSURE: 75 MMHG

## 2022-05-31 DIAGNOSIS — Z87.81 PERSONAL HISTORY OF (HEALED) TRAUMATIC FRACTURE: Chronic | ICD-10-CM

## 2022-05-31 DIAGNOSIS — S82.252K: ICD-10-CM

## 2022-05-31 LAB
BLD GP AB SCN SERPL QL: NEGATIVE — SIGNIFICANT CHANGE UP
RH IG SCN BLD-IMP: POSITIVE — SIGNIFICANT CHANGE UP

## 2022-05-31 PROCEDURE — 97161 PT EVAL LOW COMPLEX 20 MIN: CPT

## 2022-05-31 PROCEDURE — C1713: CPT

## 2022-05-31 PROCEDURE — C9399: CPT

## 2022-05-31 PROCEDURE — 27759 TREATMENT OF TIBIA FRACTURE: CPT | Mod: LT,58,59

## 2022-05-31 PROCEDURE — C1769: CPT

## 2022-05-31 PROCEDURE — 86850 RBC ANTIBODY SCREEN: CPT

## 2022-05-31 PROCEDURE — 76000 FLUOROSCOPY <1 HR PHYS/QHP: CPT

## 2022-05-31 PROCEDURE — 27724 REPAIR/GRAFT OF TIBIA: CPT | Mod: LT,58

## 2022-05-31 PROCEDURE — C1889: CPT

## 2022-05-31 PROCEDURE — 86901 BLOOD TYPING SEROLOGIC RH(D): CPT

## 2022-05-31 PROCEDURE — 86900 BLOOD TYPING SEROLOGIC ABO: CPT

## 2022-05-31 DEVICE — SCREW LOCKING STRL 5X40MM
Type: IMPLANTABLE DEVICE | Site: LEFT | Status: NON-FUNCTIONAL
Removed: 2022-05-31

## 2022-05-31 DEVICE — SCREW LOKG 5X45MM STRL
Type: IMPLANTABLE DEVICE | Site: LEFT | Status: NON-FUNCTIONAL
Removed: 2022-05-31

## 2022-05-31 DEVICE — SCREW LOKG 5X47.5MM
Type: IMPLANTABLE DEVICE | Site: LEFT | Status: NON-FUNCTIONAL
Removed: 2022-05-31

## 2022-05-31 DEVICE — SCREW LOKG 5X55MM STRL
Type: IMPLANTABLE DEVICE | Site: LEFT | Status: NON-FUNCTIONAL
Removed: 2022-05-31

## 2022-05-31 DEVICE — K-WIRE STRYKER SPI .03MM X 285MM
Type: IMPLANTABLE DEVICE | Site: LEFT | Status: NON-FUNCTIONAL
Removed: 2022-05-31

## 2022-05-31 DEVICE — IMPLANTABLE DEVICE
Type: IMPLANTABLE DEVICE | Site: LEFT | Status: NON-FUNCTIONAL
Removed: 2022-05-31

## 2022-05-31 DEVICE — GUIDEWIRE BALL TIP 3MM X 1000MM FOR T2 R1.5 FEMORAL NAILING SYSTEM
Type: IMPLANTABLE DEVICE | Site: LEFT | Status: NON-FUNCTIONAL
Removed: 2022-05-31

## 2022-05-31 DEVICE — SCREW LOKG 5X57MM STRL
Type: IMPLANTABLE DEVICE | Site: LEFT | Status: NON-FUNCTIONAL
Removed: 2022-05-31

## 2022-05-31 RX ORDER — OXYCODONE HYDROCHLORIDE 5 MG/1
10 TABLET ORAL EVERY 4 HOURS
Refills: 0 | Status: DISCONTINUED | OUTPATIENT
Start: 2022-05-31 | End: 2022-05-31

## 2022-05-31 RX ORDER — ACETAMINOPHEN 500 MG
975 TABLET ORAL EVERY 8 HOURS
Refills: 0 | Status: DISCONTINUED | OUTPATIENT
Start: 2022-05-31 | End: 2022-05-31

## 2022-05-31 RX ORDER — ASPIRIN/CALCIUM CARB/MAGNESIUM 324 MG
325 TABLET ORAL
Refills: 0 | Status: DISCONTINUED | OUTPATIENT
Start: 2022-05-31 | End: 2022-05-31

## 2022-05-31 RX ORDER — ONDANSETRON 8 MG/1
4 TABLET, FILM COATED ORAL EVERY 6 HOURS
Refills: 0 | Status: DISCONTINUED | OUTPATIENT
Start: 2022-05-31 | End: 2022-05-31

## 2022-05-31 RX ORDER — LANOLIN ALCOHOL/MO/W.PET/CERES
3 CREAM (GRAM) TOPICAL AT BEDTIME
Refills: 0 | Status: DISCONTINUED | OUTPATIENT
Start: 2022-05-31 | End: 2022-05-31

## 2022-05-31 RX ORDER — DOCUSATE SODIUM 100 MG
1 CAPSULE ORAL
Qty: 14 | Refills: 0
Start: 2022-05-31 | End: 2022-06-06

## 2022-05-31 RX ORDER — SODIUM CHLORIDE 9 MG/ML
1000 INJECTION INTRAMUSCULAR; INTRAVENOUS; SUBCUTANEOUS
Refills: 0 | Status: DISCONTINUED | OUTPATIENT
Start: 2022-05-31 | End: 2022-05-31

## 2022-05-31 RX ORDER — CEFAZOLIN SODIUM 1 G
2000 VIAL (EA) INJECTION EVERY 8 HOURS
Refills: 0 | Status: DISCONTINUED | OUTPATIENT
Start: 2022-05-31 | End: 2022-05-31

## 2022-05-31 RX ORDER — SENNA PLUS 8.6 MG/1
2 TABLET ORAL
Qty: 28 | Refills: 0
Start: 2022-05-31 | End: 2022-06-13

## 2022-05-31 RX ORDER — POLYETHYLENE GLYCOL 3350 17 G/17G
17 POWDER, FOR SOLUTION ORAL DAILY
Refills: 0 | Status: DISCONTINUED | OUTPATIENT
Start: 2022-05-31 | End: 2022-05-31

## 2022-05-31 RX ORDER — HYDROMORPHONE HYDROCHLORIDE 2 MG/ML
0.5 INJECTION INTRAMUSCULAR; INTRAVENOUS; SUBCUTANEOUS
Refills: 0 | Status: DISCONTINUED | OUTPATIENT
Start: 2022-05-31 | End: 2022-05-31

## 2022-05-31 RX ORDER — OXYCODONE HYDROCHLORIDE 5 MG/1
5 TABLET ORAL EVERY 4 HOURS
Refills: 0 | Status: DISCONTINUED | OUTPATIENT
Start: 2022-05-31 | End: 2022-05-31

## 2022-05-31 RX ORDER — OXYCODONE HYDROCHLORIDE 5 MG/1
1 TABLET ORAL
Qty: 42 | Refills: 0
Start: 2022-05-31 | End: 2022-06-06

## 2022-05-31 RX ORDER — CHLORHEXIDINE GLUCONATE 213 G/1000ML
1 SOLUTION TOPICAL ONCE
Refills: 0 | Status: DISCONTINUED | OUTPATIENT
Start: 2022-05-31 | End: 2022-05-31

## 2022-05-31 RX ORDER — ACETAMINOPHEN 500 MG
3 TABLET ORAL
Qty: 63 | Refills: 0
Start: 2022-05-31 | End: 2022-06-06

## 2022-05-31 RX ORDER — MAGNESIUM HYDROXIDE 400 MG/1
30 TABLET, CHEWABLE ORAL DAILY
Refills: 0 | Status: DISCONTINUED | OUTPATIENT
Start: 2022-05-31 | End: 2022-05-31

## 2022-05-31 RX ORDER — ONDANSETRON 8 MG/1
4 TABLET, FILM COATED ORAL ONCE
Refills: 0 | Status: DISCONTINUED | OUTPATIENT
Start: 2022-05-31 | End: 2022-05-31

## 2022-05-31 RX ORDER — MAGNESIUM HYDROXIDE 400 MG/1
30 TABLET, CHEWABLE ORAL
Qty: 0 | Refills: 0 | DISCHARGE
Start: 2022-05-31

## 2022-05-31 RX ORDER — TOBRAMYCIN 0.3 %
1 DROPS OPHTHALMIC (EYE) EVERY 4 HOURS
Refills: 0 | Status: DISCONTINUED | OUTPATIENT
Start: 2022-05-31 | End: 2022-05-31

## 2022-05-31 RX ORDER — SODIUM CHLORIDE 9 MG/ML
3 INJECTION INTRAMUSCULAR; INTRAVENOUS; SUBCUTANEOUS EVERY 8 HOURS
Refills: 0 | Status: DISCONTINUED | OUTPATIENT
Start: 2022-05-31 | End: 2022-05-31

## 2022-05-31 RX ORDER — SENNA PLUS 8.6 MG/1
2 TABLET ORAL AT BEDTIME
Refills: 0 | Status: DISCONTINUED | OUTPATIENT
Start: 2022-05-31 | End: 2022-05-31

## 2022-05-31 RX ORDER — LIDOCAINE HCL 20 MG/ML
0.2 VIAL (ML) INJECTION ONCE
Refills: 0 | Status: DISCONTINUED | OUTPATIENT
Start: 2022-05-31 | End: 2022-05-31

## 2022-05-31 RX ADMIN — Medication 1 DROP(S): at 15:20
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
## 2022-05-31 NOTE — PRE-ANESTHESIA EVALUATION ADULT - NSANTHADDINFOFT_GEN_ALL_CORE
Discussed risks and benefits of GA with patient including n/v, sore throat, cardiopulmonary complications.  Also discussed risks and benefits of regional including bleeding, infection and nerve damage.

## 2022-05-31 NOTE — PATIENT PROFILE ADULT - FALL HARM RISK - HARM RISK INTERVENTIONS

## 2022-05-31 NOTE — ASU DISCHARGE PLAN (ADULT/PEDIATRIC) - CARE PROVIDER_API CALL
Reese Leary (MD)  Orthopaedic Surgery  [address]  [place] [address]  Phone: [phone]  Fax: [phone]  Follow Up Time: 2 weeks

## 2022-05-31 NOTE — BRIEF OPERATIVE NOTE - NSICDXBRIEFPREOP_GEN_ALL_CORE_FT
PRE-OP DIAGNOSIS:  Closed complex fracture of left tibia with nonunion 31-May-2022 12:00:05  Adarsh Boateng
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
## 2022-05-31 NOTE — PHYSICAL THERAPY INITIAL EVALUATION ADULT - ADDITIONAL COMMENTS
Pt lives in a PH +3 steps no HR with his wife. Pt has walk in shower. Pt ambulated w/ Axillary crutches PTA.

## 2022-05-31 NOTE — ASU DISCHARGE PLAN (ADULT/PEDIATRIC) - ASU DC SPECIAL INSTRUCTIONSFT
You will take pain medication as needed for pain. Narcotic pain medicine can cause extreme nausea and constipation. Drink plenty of water and take diuretics (colace, Miralax) as needed. You can get them from your local pharmacy.     You will be weight bearing as tolerated of your left lower extremity    You will leave your dressing on until you follow up with Dr. Leary    You will follow up with Dr. Leary in 2 weeks

## 2022-05-31 NOTE — ASU DISCHARGE PLAN (ADULT/PEDIATRIC) - NS MD DC FALL RISK RISK
For information on Fall & Injury Prevention, visit: [email or web address] OR  [email or web address] OR  https://www.cdc.gov/steadi/patient.html

## 2022-05-31 NOTE — CHART NOTE - NSCHARTNOTEFT_GEN_A_CORE
POC    Patient seen and examined at the bedside in PACU. Patient Maltese speaking,  used during interview.  Pain well controlled with current regimen. He endorses left eye irritation.  Denies chest pain, dyspnea, lightheadedness, nausea, and vomiting. Tolerating regular diet.     ICU Vital Signs Last 24 Hrs  T(C): 36.5 (31 May 2022 12:08), Max: 36.7 (31 May 2022 07:37)  T(F): 97.7 (31 May 2022 12:08), Max: 98.1 (31 May 2022 07:37)  HR: 74 (31 May 2022 14:22) (57 - 79)  BP: 121/78 (31 May 2022 14:22) (105/57 - 121/78)  BP(mean): 75 (31 May 2022 13:30) (75 - 86)  RR: 12 (31 May 2022 13:30) (10 - 18)  SpO2: 98% (31 May 2022 14:22) (98% - 100%)    PHYSICAL EXAM:   - Gen: Awake, alert and oriented x3, pleasant/cooperative, NAD  - Cardiac: +S1S2, regular rate and rhythm. No murmurs, rubs, or gallops appreciated on auscultation  - Pulmonary: Lungs clear to auscultation bilaterally. No wheezes/rales/rhonchi appreciated on auscultation. Nonlabored respirations, good inspiratory effort.   - Abdomen: Soft, nontender, nondistended, normoactive BS x4 quadrants  - Extremities:      LLE    +Soft ACE dressing c/d/i   dressing to L hip c/d/i   No calf tenderness noted; compartments soft (-) Homans   No gross sensation deficits noted   Toes pink, mobile, well perfused. Limited hallux flexion/extension likely 2/2 effects of anesthesia.    + strong DP    IMPRESSION:   41M s/p L tibial IMN exchange with BMAP placement. No complications. Patient vitally stable and offering no acute complaints.    PLAN:   Pain Control: Oxycodone 5mg PO Q4-6hrs PRN for pain  PT: LLE = WBAT, ROM as tolerated, elevation, ice  DVT ppx:  PO BID x4 weeks  Disposition: discharge to home, f/u in 2 weeks  Left Eye Pain/?Corneal abrasion: Tobramycin 0.3% 1 drop to L eye Q4hrs x6 doses    LUIS ALBERTO Mckeon  Orthopedic Pager #[phone]

## 2022-05-31 NOTE — PHYSICAL THERAPY INITIAL EVALUATION ADULT - ACTIVE RANGE OF MOTION EXAMINATION, REHAB EVAL
L knee ROM not tested/bilateral upper extremity Active ROM was WFL (within functional limits)/bilateral  lower extremity Active ROM was WFL (within functional limits)

## 2022-05-31 NOTE — ASU DISCHARGE PLAN (ADULT/PEDIATRIC) - NURSING INSTRUCTIONS
******************************************************************************************  Next dose of TYLENOL may be taken at or after 5pm PM if needed. DO NOT take any additional products containing TYLENOL or ACETAMINOPHEN, such as VICODIN, PERCOCET, NORCO, EXCEDRIN, and any over-the-counter cold medications until this time. DO NOT CONSUME MORE THAN 2659-4619 MG of TYLENOL (acetaminophen) in a 24-hour period.   La siguiente dosis de TYLENOL puede tomarse a las 5:00 p. m. o después, si es necesario. NO tome ningún producto adicional que contenga TYLENOL o ACETAMINOPHEN, mary VICODIN, PERCOCET, NORCO, EXCEDRIN y cualquier medicamento de venta behzad para el resfriado hasta phi momento. NO CONSUMAS MÁS DE 4379-5217 MG de TYLENOL (acetaminofén) en un período de 24 horas.    ******************************************************************************************  Next dose of NSAIDs (ibuprofen, motrin, advil, naproxen, or aleve) may be taken at or after 5 PM if prescribed by your surgeon.    La siguiente dosis de FILEMON (ibuprofeno, motrin, advil, naproxeno o aleve) puede tomarse a las 5 p. m. o después si lo receta travis cirujano

## 2022-05-31 NOTE — BRIEF OPERATIVE NOTE - NSICDXBRIEFPOSTOP_GEN_ALL_CORE_FT
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

## 2022-06-13 ENCOUNTER — APPOINTMENT (OUTPATIENT)
Dept: ORTHOPEDIC SURGERY | Facility: CLINIC | Age: 42
End: 2022-06-13
Payer: COMMERCIAL

## 2022-06-13 PROCEDURE — 99024 POSTOP FOLLOW-UP VISIT: CPT

## 2022-06-20 ENCOUNTER — APPOINTMENT (OUTPATIENT)
Dept: ORTHOPEDIC SURGERY | Facility: CLINIC | Age: 42
End: 2022-06-20
Payer: COMMERCIAL

## 2022-06-20 PROCEDURE — 73590 X-RAY EXAM OF LOWER LEG: CPT | Mod: LT

## 2022-06-20 PROCEDURE — 99024 POSTOP FOLLOW-UP VISIT: CPT

## 2022-06-21 NOTE — PRE-OP CHECKLIST - HEIGHT IN FEET
Palliative Care Progress Note    Reason for Palliative Care: Goals of Care/Level of Care     Subjective      Marybeth seen this am - sleeping, rouses minimally to name.  No acute events per chart           Objective      Vital Signs:   Vital Last Value (24 Hour) 24 Hour Range   Temperature 98.4 °F (36.9 °C) (06/21/22 0727) Temp  Min: 98.4 °F (36.9 °C)  Max: 100 °F (37.8 °C)   Pulse 80 (06/21/22 0727) Pulse  Min: 64  Max: 80   Arterial   Blood Pressure   No data recorded   Non-Invasive  Blood Pressure 109/48 (06/21/22 0727) BP  Min: 109/48  Max: 143/74   Central Venous Pressure   No data recorded   Respiratory 16 (06/21/22 1001) Resp  Min: 16  Max: 17   SpO2 97 % (06/21/22 0727) SpO2  Min: 94 %  Max: 99 %     Physical Exam  Vitals and nursing note reviewed.   Constitutional:       Appearance: She is ill-appearing.   HENT:      Mouth/Throat:      Pharynx: Oropharynx is clear.   Eyes:      Conjunctiva/sclera: Conjunctivae normal.      Pupils: Pupils are equal, round, and reactive to light.   Cardiovascular:      Rate and Rhythm: Normal rate.   Pulmonary:      Effort: No respiratory distress.      Breath sounds: Normal breath sounds.   Abdominal:      General: Bowel sounds are normal.      Palpations: Abdomen is soft.   Musculoskeletal:         General: No deformity.   Skin:     General: Skin is warm.   Neurological:      Mental Status: Mental status is at baseline.   Psychiatric:         Behavior: Behavior normal.         Labs & Imaging  Recent Labs   Lab 06/20/22  0611 06/19/22  0706 06/18/22  2239   SODIUM 136 138 134*   POTASSIUM 4.2 4.1 4.7   CHLORIDE 103 103 98   CO2 30 31 32   BUN 52* 78* 82*   CREATININE 0.52 0.70 0.78   CALCIUM 8.9 8.5 9.0   ALBUMIN  --   --  2.1*   BILIRUBIN  --   --  0.5   ALKPT  --   --  53   GPT  --   --  28   AST  --   --  30   GLUCOSE 160* 129* 169*      Recent Labs   Lab 06/20/22  0611   WBC 5.6   RBC 2.99*   HGB 9.3*   HCT 29.1*           Results for orders placed or performed  during the hospital encounter of 06/18/22 (from the past 72 hour(s))   XR CHEST PA OR AP 1 VIEW    Impression    No significant interval change.      Electronically Signed by: MORA LYON M.D.   Signed on: 6/19/2022 12:18 AM              Assessment      89 year old female with multiple medical comorbidities presents with increased weakness, confusion   1. Overall failure to thrive  2. UTI  3. Encounter palliative care        Plan        1. Spoke with patient's son/Bartolo.  He will defer home hospice for now.  Was appreciative of hospice meeting.  Has 24/7 care givers in place as well as home based palliative care.       Advance Care Planning/Goals of Care      Medical Decision-making capacity: No          Total combined time for today's Face to Face encounter was 25 minutes, with > 50% of that time spent counseling and coordinating care regarding the above.  Discussed With: n/a    Thank you for involving Palliative and Supportive Care. Please contact the covering provider via [employer] with further questions or concerns.    Moraima Wayne, DO    Note to Patient: The 21st Century Cures Act makes medical notes like these available to patients in the interest of transparency. However, be advised this is a medical document. It is intended as peer to peer communication. It is written in medical language and may contain abbreviations or verbiage that are unfamiliar. It may appear blunt or direct. Medical documents are intended to carry relevant information, facts as evident, and the clinical opinion of the practitioner.  This note may have been transcribed using a voice dictation system. Voice recognition errors may occur. This should not be taken to alter the content or meaning of this note.         Metrics            LVAD: No  #1 Post-Visit: Yes  Reason if No #1 on Chart Prior to Discharge from Palliative Care: OTHER  #2 Post-Visit: Yes  #3 Post-Visit: Yes        5

## 2022-07-11 ENCOUNTER — APPOINTMENT (OUTPATIENT)
Dept: ORTHOPEDIC SURGERY | Facility: CLINIC | Age: 42
End: 2022-07-11

## 2022-07-11 PROCEDURE — 99024 POSTOP FOLLOW-UP VISIT: CPT

## 2022-07-11 PROCEDURE — 73590 X-RAY EXAM OF LOWER LEG: CPT | Mod: LT

## 2022-08-29 ENCOUNTER — APPOINTMENT (OUTPATIENT)
Dept: ORTHOPEDIC SURGERY | Facility: CLINIC | Age: 42
End: 2022-08-29

## 2022-08-29 VITALS
BODY MASS INDEX: 28.12 KG/M2 | WEIGHT: 175 LBS | DIASTOLIC BLOOD PRESSURE: 71 MMHG | SYSTOLIC BLOOD PRESSURE: 111 MMHG | HEART RATE: 47 BPM | HEIGHT: 66 IN

## 2022-08-29 PROCEDURE — 73590 X-RAY EXAM OF LOWER LEG: CPT | Mod: LT

## 2022-08-29 PROCEDURE — 99024 POSTOP FOLLOW-UP VISIT: CPT

## 2022-10-03 ENCOUNTER — APPOINTMENT (OUTPATIENT)
Dept: ORTHOPEDIC SURGERY | Facility: CLINIC | Age: 42
End: 2022-10-03

## 2022-10-03 VITALS — WEIGHT: 175 LBS | HEIGHT: 66 IN | BODY MASS INDEX: 28.12 KG/M2

## 2022-10-03 PROCEDURE — 99213 OFFICE O/P EST LOW 20 MIN: CPT

## 2022-10-03 PROCEDURE — 73590 X-RAY EXAM OF LOWER LEG: CPT | Mod: LT

## 2022-11-28 ENCOUNTER — APPOINTMENT (OUTPATIENT)
Dept: ORTHOPEDIC SURGERY | Facility: CLINIC | Age: 42
End: 2022-11-28

## 2022-11-28 PROCEDURE — 99213 OFFICE O/P EST LOW 20 MIN: CPT

## 2022-11-28 PROCEDURE — 73590 X-RAY EXAM OF LOWER LEG: CPT | Mod: LT

## 2023-01-11 NOTE — PATIENT PROFILE ADULT - NSPROPTRIGHTBILLOFRIGHTS_GEN_A_NUR
Physical Therapy    Patient not seen in therapy.     Discontinue therapy: patient request    Physical therapy orders received and chart review completed. Collaborated with OT Blanca who stated patient is moving independently post surgery and has no acute PT needs or concerns at this time. OT reports patient has a 2ww at home and has been using prior to surgery. PT to sign off.                                  Therapy procedure time and total treatment time can be found documented on the Time Entry flowsheet   patient

## 2023-03-06 ENCOUNTER — APPOINTMENT (OUTPATIENT)
Dept: ORTHOPEDIC SURGERY | Facility: CLINIC | Age: 43
End: 2023-03-06
Payer: COMMERCIAL

## 2023-03-06 VITALS
HEIGHT: 66 IN | WEIGHT: 178 LBS | SYSTOLIC BLOOD PRESSURE: 113 MMHG | BODY MASS INDEX: 28.61 KG/M2 | TEMPERATURE: 97.3 F | DIASTOLIC BLOOD PRESSURE: 72 MMHG | HEART RATE: 54 BPM | OXYGEN SATURATION: 98 %

## 2023-03-06 PROCEDURE — 73590 X-RAY EXAM OF LOWER LEG: CPT | Mod: LT

## 2023-03-06 PROCEDURE — 99213 OFFICE O/P EST LOW 20 MIN: CPT

## 2023-06-12 ENCOUNTER — APPOINTMENT (OUTPATIENT)
Dept: ORTHOPEDIC SURGERY | Facility: CLINIC | Age: 43
End: 2023-06-12
Payer: MEDICAID

## 2023-06-12 DIAGNOSIS — S82.252K: ICD-10-CM

## 2023-06-12 PROCEDURE — 99213 OFFICE O/P EST LOW 20 MIN: CPT

## 2023-06-12 PROCEDURE — 73590 X-RAY EXAM OF LOWER LEG: CPT | Mod: LT

## 2023-06-16 PROBLEM — S82.252K: Status: ACTIVE | Noted: 2022-03-21

## 2023-12-26 NOTE — H&P PST ADULT - PRO INTERPRETER NEED 2
[facility] ONCOLOGY  Hematology/Oncology Clinic New Patient Note     Patient: Jerry Gonzales Age: 61 year old  MRN: [id number]      Date of Visit: September 22, 2023     Chief Complaint:   Chief Complaint   Patient presents with   • Office Visit   • Multiple Myeloma     Cancer History:   Cancer Staging   Multiple myeloma not having achieved remission (CMD)  Staging form: Plasma Cell Myeloma and Plasma Cell Disorders, AJCC 8th Edition  - Clinical stage from 8/28/2023: RISS Stage III (Beta-2-microglobulin (mg/L): 7.5, Albumin (g/dL): 2.6, ISS: Stage III, High-risk cytogenetics: Absent, LDH: Elevated) - Signed by Keyla Abarca DO on 8/28/2023      Oncology History   Multiple myeloma not having achieved remission (CMD)   7/6/2023 Initial Diagnosis    Multiple myeloma not having achieved remission (CMD)     8/28/2023 -  Cancer Staged    Staging form: Plasma Cell Myeloma and Plasma Cell Disorders, AJCC 8th Edition  - Clinical stage from 8/28/2023: RISS Stage III (Beta-2-microglobulin (mg/L): 7.5, Albumin (g/dL): 2.6, ISS: Stage III, High-risk cytogenetics: Absent, LDH: Elevated) - Signed by Keyla Abarca DO on 8/28/2023 9/7/2023 -  Supportive Treatment    Treatment Summary   Treatment goal Supportive   Plan Name ZOLEDRONIC ACID (ZOMETA) EVERY 21 DAYS: BONE METASTASES/MULTIPLE MYELOMA   Status Active   Start Date 9/7/2023   End Date 4/25/2024 (Planned)   Provider Keyla Abarca DO   Chemotherapy [No matching medication found in this treatment plan]          9/7/2023 -  Chemotherapy    lenalidomide (REVLIMID) 15 MG capsule, 15 mg (60 % of original dose 25 mg), Oral, EVERY OTHER DAY, 1 of 1 cycle, Start date: --, End date: --  Dose modification: 15 mg (original dose 25 mg, Cycle 1, Reason: Insufficiency - Renal)  lenalidomide (REVLIMID) 15 MG capsule, 15 mg (60 % of original dose 25 mg), Oral, EVERY OTHER DAY, 1 of 6 cycles  Dose modification: 15 mg (original dose 25 mg, Cycle 1, Reason: Insufficiency -  Renal)  bortezomib (VELCADE) SubQ chemo injection 2.5 mg, 1.3 mg/m2 = 2.5 mg, Subcutaneous, ONCE, 1 of 6 cycles  Administration: 2.5 mg (9/7/2023), 2.5 mg (9/14/2023), 2.5 mg (9/21/2023)  DARAtumumab (DARZALEX) 600 mg in sodium chloride 0.9 % 500 mL chemo infusion, 8 mg/kg = 600 mg (100 % of original dose 8 mg/kg), Intravenous, ONCE, 1 of 6 cycles  Dose modification: 8 mg/kg (original dose 8 mg/kg, Cycle 1, Reason: Physician Order, Comment: Darzalex split dose for Cycle 1 Day 1-2)  Administration: 1,200 mg (9/14/2023), 1,200 mg (9/21/2023), 600 mg (9/7/2023), 600 mg (9/8/2023)          SUBJECTIVE     HPI:  Jerry Gonzales is a 61 year old male who presents for evaluation of the above complaint.     Patient presents for evaluation of free monoclonal immunoglobulin. He was referred by his primary care physician. Most recent labs on 3/30/2023 revealed urine protein 51. Urine immunofixation was done, showing free monoclonal kappa light chain. He was then referred to hematology clinic.     Patient has a Hx of HTN and erectile dysfunction. He notes he request STI testing and UA with his PCP which prompted the protein testing. Patient has no history of kidney issues. He has no other medical conditions. He has gained some weight, he has not been working out as much. No fevers, chills, bruising, bleeding. No other symptoms.     No hx recent fractures.     No falls. No recent traveling.    Patient is a never smoker. Does not drink. No drug use.     Patient is independent in ADLs.    Patient has no FH of cancer.     Subjective: 4/27/2023  Patient presents for interval follow up. Feels well at present time. No new symptoms since last visit.    Subjective: 7/10/2023  Patient presents for interval follow up. An ECHO on 5/30/2023 revealed an EF of 60%. He presented to the ED on 6/21/2023 with a cc of abnormal labs. He was admitted to the hospital for further evaluation. Patient was found to be hypercalcemic and there continued to  be concern for underlying myeloma. A bone marrow biopsy was performed on 6/23/2023. Pathology revealed plasma cell myeloma. He presents today to discuss bone marrow biopsy and review plan of care. Patient feels fatigued, continues to be tired after discharge. No SOB, chest pain, dizziness. Doesn't have much of an appetite. No bleeding or bruising. Hypercalcemia has resolved.    Subjective: 8/28/2023  Patient presents for interval follow up. A PET/CT on 7/14/2023 revealed extensive active myeloma throughout the axial and appendicular skeleton as described; of note, a large lytic myelomatous mass centered at the posterior skull base extends into posterior fossa and foramen magnum; also of note, a 2 cm lytic lesion in the right intertrochanteric femur with cortical erosion is at high risk for pathologic fracture. Smaller metastases are present in the contralateral proximal femur as well; pathologic rib fractures as described; no evidence of extraskeletal malignancy; indeterminate 14 mm lesion in the left kidney attenuates slightly higher than simple fluid. He was transferred to [place] the same day. He presented to the ED on 7/27/2023 with a cc of abd pain. A CT pelvis on 7/27/2023 revealed subacute diverticulitis with ruptured diverticulum (descending-sigmoid colon) and secondary diffuse pneumoperitoneum; reactive ascites and free fluid in the pelvis. No discrete localized abscess visualized (limited without intravenous or oral contrast) Secondary adynamic ileus . Mild to moderate constipation; diffuse lytic lesions throughout the axial and appendicular skeleton consistent with history of multiple myeloma. Subacute compression fracture deformities of the T10 and T12 vertebral bodies; intermediate attenuating exophytic lesion (14 mm) at the medial aspect left lower pole kidney. He was admitted to the hospital for further evaluation. He underwent an emergent exploratory laparotomy, drainage of retroperitoneal abscess,  maria antonia's procedure, appendectomy on 7/26/2023. Patient was in the ICU for a short time following surgery but ultimately recovered well and was DC in stable condition. Patient presents for consideration of C1 chemotherapy. He notes he feels well, has some constipation on occasion. He is passing some stool through rectum. Is eating well. No blood in stool. Back pain is better controlled.    Subjective: 9/21/2023  Patient presents for interval follow up and C1D15 of chemotherapy. Patient tolerating chemotherapy well thusfar, has not had any reactions to infusions. Is pending appt at [facility] in October.     Past Medical History:   Diagnosis Date   • AF (atrial fibrillation) (CMD)    • Back pain    • Erectile dysfunction    • Essential (primary) hypertension    • GERD (gastroesophageal reflux disease)    • Monoclonal gammopathy    • Myeloma (CMD)         Past Surgical History:   Procedure Laterality Date   • Removal of sperm duct(s)  05/27/2022   • Vein surgery Right         ALLERGIES:  No Known Allergies     Current Outpatient Medications   Medication Sig Dispense Refill   • lenalidomide (REVLIMID) 25 MG capsule Take 1 capsule by mouth daily. Do not start before September 15, 2023. Take for 14 days followed by 7 days off. 14 capsule 0   • metoPROLOL succinate (TOPROL-XL) 50 MG 24 hr tablet Take 1 tablet by mouth daily. 30 tablet 1   • montelukast (SINGULAIR) 10 MG tablet Take the morning of daratumumab 16 tablet 0   • apixaBAN (ELIQUIS) 5 MG Tab Take 1 tablet by mouth every 12 hours for 60 doses. 60 tablet 1   • dilTIAZem (DILT-XR) 180 MG 24 hr capsule Take 1 capsule by mouth daily. 30 capsule 1   • allopurinol (ZYLOPRIM) 100 MG tablet Take 100 mg by mouth daily.     • fluconazole (DIFLUCAN) 100 MG tablet Take 100 mg by mouth daily.     • pantoprazole (PROTONIX) 40 MG tablet Take 40 mg by mouth daily.     • ondansetron (ZOFRAN ODT) 8 MG disintegrating tablet Place 1 tablet onto the tongue every 8 hours as needed for  Nausea. 30 tablet 1   • sulfamethoxazole-trimethoprim (BACTRIM DS) 800-160 MG per tablet Take 1 tablet by mouth 3 days a week. M, W, F 36 tablet 0   • acyclovir (ZOVIRAX) 200 MG capsule Take 2 capsules by mouth in the morning and 2 capsules in the evening. 120 capsule 2   • prochlorperazine (COMPAZINE) 10 MG tablet Take 1 tablet by mouth every 6 hours as needed for Nausea or Vomiting. 30 tablet 5     No current facility-administered medications for this visit.        Social History     Socioeconomic History   • Marital status:      Spouse name: Not on file   • Number of children: Not on file   • Years of education: Not on file   • Highest education level: Not on file   Occupational History   • Not on file   Tobacco Use   • Smoking status: Never   • Smokeless tobacco: Never   Vaping Use   • Vaping Use: never used   Substance and Sexual Activity   • Alcohol use: Not Currently     Comment: rarely   • Drug use: Never   • Sexual activity: Not on file   Other Topics Concern   • Not on file   Social History Narrative   • Not on file     Social Determinants of Health     Financial Resource Strain: Low Risk  (7/28/2023)    Financial Resource Strain    • Social Determinants: Financial Resource Strain: None   Food Insecurity: No Food Insecurity (7/28/2023)    Food Insecurity    • Social Determinants: Food Insecurity: Never   Transportation Needs: No Transportation Needs (7/28/2023)    PRAPARE - Transportation    • Lack of Transportation (Medical): No    • Lack of Transportation (Non-Medical): No   Physical Activity: Not on file   Stress: Not on file   Social Connections: Socially Integrated (7/28/2023)    Social Connections    • Social Determinants: Social Connections: 5 or more times a week   Intimate Partner Violence: Not At Risk (7/28/2023)    Intimate Partner Violence    • Social Determinants: Intimate Partner Violence Past Fear: No    • Social Determinants: Intimate Partner Violence Current Fear: No        Family  History   Problem Relation Age of Onset   • Rheumatoid Arthritis Mother    • Patient is unaware of any medical problems Father    • Diabetes Paternal Grandmother    • Hypertension Paternal Grandmother         OB History   No obstetric history on file.        Review of Systems:  A 10 point review of systems was conducted and is negative unless mentioned in HPI.    OBJECTIVE    Physical Examination    Performance Status:   ECOG [09/21/23 1150]   ECOG Performance Status 1     Oncology Encounter Vitals [09/21/23 0950]   ONC OP Encounter Vitals Group      BP (!) 156/76      Heart Rate (!) 52      Resp 16      Temp 98.5 °F (36.9 °C)      Temp src Temporal      SpO2 100 %      Weight 171 lb 11.2 oz (77.9 kg)      Height 5' 10\" (1.778 m)      Pain Score  0      Pain Location       Pain Education?       BSA (Calculated - m2) - [employer] Miguel 1.96      BSA (Calculated - sq m) 1.96      BMI (Calculated) 24.64      General: awake, alert, oriented, NAD, appears staged age  HEENT: normocephalic, without obvious abnormality, conjunctiva clear bilaterally, no scleral icterus, oropharynx clear without exudates  Lungs: clear to auscultation bilaterally, no wheezing/rales/rhonchi  Heart: regular rate and rhythm, S1 and S2 normal, no murmur/rubs/gallops  Abdomen: soft, non-tender, non-distended, NABS, no palpable organomegaly, + ostomy with soft brown stool  Extremities: no clubbing, cyanosis, edema  Musculoskeletal: symmetrical strength and tone, full active and passive ROM  Skin: skin color, texture, and turgor normal, no visible rashes or lesions  Neurologic: oriented x 3, no focal deficits  Psych: mood and affect normal, good insight    Laboratory/Imaging    Office Visit on 09/14/2023   Component Date Value   • WBC 09/14/2023 6.3    • RBC 09/14/2023 3.01 (L)    • HGB 09/14/2023 9.7 (L)    • HCT 09/14/2023 29.4 (L)    • MCV 09/14/2023 97.7    • MCH 09/14/2023 32.2    • MCHC 09/14/2023 33.0    • RDW-CV 09/14/2023 15.1 (H)    •  RDW-SD 09/14/2023 54.3 (H)    • PLT 09/14/2023 238    • Neutrophil, Percent 09/14/2023 77    • Lymphocytes, Percent 09/14/2023 12    • Mono, Percent 09/14/2023 7    • Eosinophils, Percent 09/14/2023 4    • Basophils, Percent 09/14/2023 0    • Immature Granulocytes 09/14/2023 0    • Absolute Neutrophils 09/14/2023 4.9    • Absolute Lymphocytes 09/14/2023 0.7 (L)    • Absolute Monocytes 09/14/2023 0.4    • Absolute Eosinophils  09/14/2023 0.2    • Absolute Basophils 09/14/2023 0.0    • Absolute Immature Granul* 09/14/2023 0.0    Lab Services on 09/12/2023   Component Date Value   • Sodium, Urine 09/12/2023 60    • Magnesium 09/12/2023 2.2    • Fasting Status 09/12/2023 0    • Sodium 09/12/2023 138    • Potassium 09/12/2023 4.1    • Chloride 09/12/2023 105    • Carbon Dioxide 09/12/2023 26    • Anion Gap 09/12/2023 11    • Calcium 09/12/2023 8.1 (L)    • Phosphorus 09/12/2023 2.9    • Albumin 09/12/2023 3.2 (L)    • Glucose 09/12/2023 110 (H)    • BUN 09/12/2023 18    • Creatinine 09/12/2023 1.11    • Glomerular Filtration Ra* 09/12/2023 76    • BUN/Cr 09/12/2023 16    • WBC 09/12/2023 7.3    • RBC 09/12/2023 3.11 (L)    • HGB 09/12/2023 9.8 (L)    • HCT 09/12/2023 30.4 (L)    • MCV 09/12/2023 97.7    • MCH 09/12/2023 31.5    • MCHC 09/12/2023 32.2    • PLT 09/12/2023 267    • RDW-CV 09/12/2023 15.0    • RDW-SD 09/12/2023 54.0 (H)    • NRBC 09/12/2023 0    Lab Services on 09/11/2023   Component Date Value   • Fasting Status 09/11/2023 0    • Sodium 09/11/2023 141    • Potassium 09/11/2023 3.5    • Chloride 09/11/2023 107    • Carbon Dioxide 09/11/2023 26    • Anion Gap 09/11/2023 12    • Glucose 09/11/2023 111 (H)    • BUN 09/11/2023 20    • Creatinine 09/11/2023 1.15    • Glomerular Filtration Ra* 09/11/2023 73    • BUN/Cr 09/11/2023 17    • Calcium 09/11/2023 7.9 (L)    • Bilirubin, Total 09/11/2023 0.1 (L)    • GOT/AST 09/11/2023 12    • GPT/ALT 09/11/2023 17    • Alkaline Phosphatase 09/11/2023 100    • Albumin  09/11/2023 3.1 (L)    • Protein, Total 09/11/2023 6.0 (L)    • Globulin 09/11/2023 2.9    • A/G Ratio 09/11/2023 1.1    • TSH 09/11/2023 3.194    • Fasting Status 09/11/2023 0    • Sodium 09/11/2023 140    • Potassium 09/11/2023 3.6    • Chloride 09/11/2023 107    • Carbon Dioxide 09/11/2023 27    • Anion Gap 09/11/2023 10    • Glucose 09/11/2023 110 (H)    • BUN 09/11/2023 20    • Creatinine 09/11/2023 1.15    • Glomerular Filtration Ra* 09/11/2023 73    • BUN/Cr 09/11/2023 17    • Calcium 09/11/2023 7.8 (L)    • Bilirubin, Total 09/11/2023 0.1 (L)    • GOT/AST 09/11/2023 12    • GPT/ALT 09/11/2023 18    • Alkaline Phosphatase 09/11/2023 101    • Albumin 09/11/2023 3.2 (L)    • Protein, Total 09/11/2023 5.8 (L)    • Globulin 09/11/2023 2.6    • A/G Ratio 09/11/2023 1.2    • Fasting Status 09/11/2023 0    • Sodium 09/11/2023 140    • Potassium 09/11/2023 3.6    • Chloride 09/11/2023 105    • Carbon Dioxide 09/11/2023 27    • Anion Gap 09/11/2023 12    • Glucose 09/11/2023 109 (H)    • BUN 09/11/2023 20    • Creatinine 09/11/2023 1.18 (H)    • Glomerular Filtration Ra* 09/11/2023 71    • BUN/Cr 09/11/2023 17    • Calcium 09/11/2023 7.9 (L)    • Bilirubin, Total 09/11/2023 0.2    • GOT/AST 09/11/2023 14    • GPT/ALT 09/11/2023 17    • Alkaline Phosphatase 09/11/2023 101    • Albumin 09/11/2023 3.1 (L)    • Protein, Total 09/11/2023 5.9 (L)    • Globulin 09/11/2023 2.8    • A/G Ratio 09/11/2023 1.1    • WBC 09/11/2023 9.6    • RBC 09/11/2023 3.10 (L)    • HGB 09/11/2023 9.7 (L)    • HCT 09/11/2023 31.2 (L)    • MCV 09/11/2023 100.6 (H)    • MCH 09/11/2023 31.3    • MCHC 09/11/2023 31.1 (L)    • RDW-CV 09/11/2023 15.4 (H)    • RDW-SD 09/11/2023 57.1 (H)    • PLT 09/11/2023 279    • NRBC 09/11/2023 0    • Neutrophil, Percent 09/11/2023 80    • Lymphocytes, Percent 09/11/2023 12    • Mono, Percent 09/11/2023 6    • Eosinophils, Percent 09/11/2023 2    • Basophils, Percent 09/11/2023 0    • Immature Granulocytes 09/11/2023  0    • Absolute Neutrophils 09/11/2023 7.7    • Absolute Lymphocytes 09/11/2023 1.1    • Absolute Monocytes 09/11/2023 0.6    • Absolute Eosinophils  09/11/2023 0.2    • Absolute Basophils 09/11/2023 0.0    • Absolute Immature Granul* 09/11/2023 0.0    Office Visit on 09/07/2023   Component Date Value   • Beta 2 Microglobulin 09/07/2023 7.0 (H)    • LD, Total 09/07/2023 168    • M Protein 1 09/07/2023 0.1 (H)    • M Protein 2 09/07/2023 0.1 (H)    • M Protein 3 09/07/2023 0.1 (H)    • Protein, Total 09/07/2023 6.6    • Total Globulin 09/07/2023 2.7    • Albumin 09/07/2023 3.9    • Alpha 1 09/07/2023 0.3    • Alpha 2 09/07/2023 1.0 (H)    • Beta 09/07/2023 0.6 (L)    • Gamma 09/07/2023 0.6 (L)    • Serum Protein Electropho* 09/07/2023   Three monoclonal proteins are present in the gamma region and measure 0.1 gm/dL, 0.1 gm/dl and 0.1 gm/dL respectively.  Refer to immunofixation for identification.           • Kappa, Free 09/07/2023 444.95 (H)    • Lambda, Free 09/07/2023 <0.40 (L)    • Kappa/ Lambda Ratio 09/07/2023     • Serum Immunofixation Pat* 09/07/2023   IgA kappa monoclonal protein is present.  Two Monoclonal kappa light chains are present.          • Immunoglobulin G 09/07/2023 475 (L)    • Immunoglobulin A 09/07/2023 279    • Immunoglobulin M 09/07/2023 20 (L)    • WBC 09/07/2023 7.7    • RBC 09/07/2023 2.93 (L)    • HGB 09/07/2023 9.3 (L)    • HCT 09/07/2023 28.8 (L)    • MCV 09/07/2023 98.3    • MCH 09/07/2023 31.7    • MCHC 09/07/2023 32.3    • RDW-CV 09/07/2023 15.4 (H)    • RDW-SD 09/07/2023 54.9 (H)    • PLT 09/07/2023 264    • Neutrophil, Percent 09/07/2023 71    • Lymphocytes, Percent 09/07/2023 17    • Mono, Percent 09/07/2023 8    • Eosinophils, Percent 09/07/2023 3    • Basophils, Percent 09/07/2023 1    • Immature Granulocytes 09/07/2023 0    • Absolute Neutrophils 09/07/2023 5.4    • Absolute Lymphocytes 09/07/2023 1.3    • Absolute Monocytes 09/07/2023 0.6    • Absolute Eosinophils  09/07/2023  0.2    • Absolute Basophils 09/07/2023 0.1    • Absolute Immature Granul* 09/07/2023 0.0    Office Visit on 08/28/2023   Component Date Value   • VIA MED ONC PATHWAYS TAG 08/29/2023 [id number]    • VIA MED ONC PATHWAYS TAG 08/29/2023 [id number]    Lab Services on 08/22/2023   Component Date Value   • WBC 08/22/2023 8.5    • RBC 08/22/2023 2.56 (L)    • HGB 08/22/2023 8.0 (L)    • HCT 08/22/2023 25.3 (L)    • MCV 08/22/2023 98.8    • MCH 08/22/2023 31.3    • MCHC 08/22/2023 31.6 (L)    • RDW-CV 08/22/2023 15.0    • RDW-SD 08/22/2023 54.2 (H)    • PLT 08/22/2023 375    • NRBC 08/22/2023 0    • Neutrophil, Percent 08/22/2023 73    • Lymphocytes, Percent 08/22/2023 15    • Mono, Percent 08/22/2023 8    • Eosinophils, Percent 08/22/2023 2    • Basophils, Percent 08/22/2023 1    • Immature Granulocytes 08/22/2023 1    • Absolute Neutrophils 08/22/2023 6.3    • Absolute Lymphocytes 08/22/2023 1.3    • Absolute Monocytes 08/22/2023 0.7    • Absolute Eosinophils  08/22/2023 0.1    • Absolute Basophils 08/22/2023 0.1    • Absolute Immature Granul* 08/22/2023 0.1        CT LUMBAR SPINE WO CONTRAST  Narrative: CT lumbar spine without contrast.    CLINICAL HISTORY: Bone neoplasm.  Swelling across lumbar region.    COMPARISON: CT of the abdomen and pelvis dated 07/27/2023.    TECHNIQUE: Axial non-infused images through the abdomen and pelvis.   Sagittal and coronal reconstructed images.  Automated exposure control  utilized.    FINDINGS: There is moderate to severe compression of T12, similar to the  prior study.  The remainder of the visualized vertebral body heights appear  intact.  There is disc space narrowing at L4-L5 and L5-S1.  Multiple lucent  foci are noted within the visualized bones, the largest focus within the  anterior aspect of L4, measuring up to 2.9 cm in width.    There is elongated fluid density within the posterior subcutaneous tissues  at the level of the lumbar spine, not seen on the prior study and  possibly  representing soft tissue edema.  No loculated appearing fluid collection is  identified within this region.      Mildly dilated, fluid-filled bowel loops are noted within the visualized  abdomen, measuring up to 3.5 cm.  There is hazy infiltration of the fat  within the mesentery of the lower abdomen.  Elongated increased density is noted within the left upper pelvis  laterally, which may represent a surgical drain, incompletely imaged.   There is slightly elongated fluid density and some foci of air within the  lateral aspect of the left lower abdomen/upper pelvis adjacent to this  drain, possibly representing a postoperative collection, with infection not  excluded.      A 1.5 cm slightly hyperdense focus is seen arising from the lower left  kidney medially, similar to the prior study.  A solid mass is not excluded.   Additional slightly smaller hypodense foci are noted within the visualized  lower left kidney.  Correlation with ultrasound suggested.    Impression: 1.  Multiple lucent lesions within the visualized bones, suspicious for  bony metastases or myeloma.  Moderate to severe compression of T12 appears  unchanged compared with the prior study.  2.  Elongated fluid density within the posterior subcutaneous tissues at  the level of the lumbar spine, not seen on the prior study and possibly  representing soft tissue edema.  No loculated appearing fluid collection is  seen within the posterior soft tissues.  3.  Mildly dilated, fluid-filled bowel within the visualized abdomen.  4.  Slightly elongated fluid density and foci of air within the lateral  aspect of the left lower abdomen/upper pelvis adjacent to what may  represent a surgical drain.  This could represent a postoperative  collection, with infection not excluded.  Follow-up abdomen and pelvis CT  could be obtained.    5.  Hyperdense focus arising from the lower left kidney measuring 1.5 cm.   A solid mass is not excluded.  Additional smaller  hypodense foci are noted  within the visualized left kidney.  Correlation with ultrasound suggested.    Electronically Signed by: ALDEN CIFUENTES M.D.   Signed on: 7/30/2023 4:29 PM   Workstation ID: [id number]        ASSESSMENT/PLAN    # Diagnosis  There are no diagnoses linked to this encounter.     #multiple myeloma, R-ISS stage III  -Most recent labs on 3/30/2023 revealed urine protein 51.    -urine NOY showing free monoclonal kappa light chain.    Notes from PCP reviewed. Patient's chart reviewed. Physiology of monoclonal protein reviewed. He will require plasma cell dyscrasia workup.     Labs drawn, showing M spike and IgA monoclonal gammopathy--multiple monoclonal proteins found.     #Hx of HTN  #Hx of erectile dysfunction    Patient initially wanted a trial of observation after initial M spike was found. He did not want bone marrow biopsy at that time. However, since that time, patient was admitted to [facility] for hypercalcemia and abdominal pain. He consented to bone marrow biopsy and discharged after Ca levels normalized. Patient's bone marrow biopsy showing plasma cell myeloma. The core biopsy shows focal increased numbers of mature plasma cells.  However, the clot section shows a more significant plasma cell infiltrate.   and MUM1 highlight the numerous plasma cells, accounting for 60-70% of the cellularity in the clot sections.  CD20 and CD3 highlight sparsely scattered B and T cells, respectively. Kappa and lambda by IHC and in situ hybridization shows unequivocal kappa restriction.  Aspirate sections and touch preparations show similar findings. Overall, the above findings are consistent with a plasma cell myeloma. 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 The CKS1B (1q21)/CDKN2C (1p32.3) ratio is 1.8. Amplification of 1q21 is associated with an unfavorable prognosis in multiple myeloma.  There was no evidence of a t(4;14), t(11;14), or t(14;16).  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 Only one signal for both the 13q14.3 and the 13q34 regions were observed, which is consistent with monosomy 13. No evidence of deletion of 17p. Chromosomal analysis showing abnormal male hyperdiploid karyotype in 2 of 20 cells, with multiple structural and numerical anomalies. There is a gain of chromosome 1, additional material of unknown origin attached to 1p, and a pseudodicentric chromosome composed of chromosomes 21 and 1. Taken together, the abnormalities of chromosome 1 result in a net gain (\"amplification\") of 1q. There is also additional material of unknown origin attached to 2p and 14q. There are losses of chromosomes 6 and 13 and gains of chromosomes 2, 3, 5, 7, 8, 9, 15, and 19. Interpretation of chromosomal analysis can be associated with standard risk in multiple myeloma, the gain of 1q is considered a high risk genetic abnormality.  -A PET/CT on 7/14/2023 revealed extensive active myeloma throughout the axial and appendicular skeleton as described; of note, a large lytic myelomatous mass centered at the posterior skull base extends into posterior fossa and foramen magnum; also of note, a 2 cm lytic lesion in the right intertrochanteric femur with cortical erosion is at high risk for pathologic fracture. Smaller metastases are present in the contralateral proximal femur as well; pathologic rib fractures as described; no evidence of extraskeletal malignancy; indeterminate 14 mm lesion in the left kidney attenuates slightly higher than simple fluid. He was transferred to [facility] the same day.    Given his high risk features, patient has R-ISS stage III disease.     #Other   -He presented to the ED on 7/27/2023 with a cc of abd pain. A CT pelvis on 7/27/2023 revealed subacute diverticulitis with ruptured diverticulum  (descending-sigmoid colon) and secondary diffuse pneumoperitoneum; reactive ascites and free fluid in the pelvis. No discrete localized abscess visualized (limited without intravenous or oral contrast) Secondary adynamic ileus . Mild to moderate constipation; diffuse lytic lesions throughout the axial and appendicular skeleton consistent with history of multiple myeloma. Subacute compression fracture deformities of the T10 and T12 vertebral bodies; intermediate attenuating exophytic lesion (14 mm) at the medial aspect left lower pole kidney. He was admitted to the hospital for further evaluation.  He underwent an emergent exploratory laparotomy, drainage of retroperitoneal abscess, maria antonia's procedure, appendectomy on 7/26/2023. Patient was in the ICU for a short time following surgery but ultimately recovered well and was DC in stable condition.    Patient's pathology and genetics reviewed.     The natural history of this disease and prognosis in the absence of therapy was discussed with the patient and understanding was verbalized. The impact of therapy on the course of disease and prognosis was then reviewed and an understanding of the benefits of each therapy were confirmed.  We subsequently reviewed the short-term and long-terms toxicities/side-effects of the treatment options presented and the patient acknowledged the concept of balancing the risks and benefits of the options, including the option for observation or no treatment.    After jointly selecting a plan of care, we reviewed the risk/benefit considerations and schedule for the selected treatment plan. Understanding was confirmed by the “read-back” technique.    Given initial PET scan findings, patient was admitted to [facility] from [facility] for further evaluation and management of multiple myeloma with myelomatous mass at skull base concerning for mass effect on cerebellum. Pt was started on CyBorD therapy on 07/16 to attempt to shrink mass since he was not  yet set to start his primary treatment, Wendie-RVd. Treatment was overall well tolerated, however patient required one dose Rasburicase 3g for uric acid 13, which subsequently declined. Pt remains on allopurinol and infection prophylaxis (renally dosed) at discharge. Pt was also evaluated by neurosurgery who determined there was no surgical intervention necessary and pt was started on dexamethasone with plan for radiation oncology evaluation. Radiation oncology eval determined patient may benefit from palliative radiation to skull base and right femur lesions, however no acute intervention was required. Due to several lytic bone lesions patient was evaluated by orthopedic surgery. There was not indication for urgent fixation, however patient is to follow up with orthopedic oncology in the future to determine need for intervention. Currently, he is to ambulate with a walker and wear a off the shelf TSLO brace provided for comfort and protection in case of fall.     Patient with established follow up with Dr. Morelos at [facility] for transplant evaluation, pending appt in October 2023.      Plan:  -recommend consideration of induction chemotherapy followed by consideration of autologous HSCT. This was reviewed with patient in detail. Wendie-VRd has been approved and drugs have been delivered.   -case discussed with Dr. Gonsales, CRC surgery. He has healed from emergent Rossi's and is clear to start chemotherapy. He will eventually require ostomy reversal, but this will be done at a later date.   -ok for C1D15 chemotherapy. New myeloma labs to be drawn with D1 of each cycle.   -add bone agent for concurrent therapy. Will use Zometa at present time due to renal function.  -he has follow up with Dr. Seals. Will follow up his recommendations.   -recommend annual influenza vaccine. Recommend updated bivalent COVID vaccine series, if not already done.  -will increase Revlimid to full dose as renal function has improved significantly  and patient no longer requires renal dosing. Will made continued adjustments as needed.  -case discussed with ONN.    Follow Up: RTC 1 week for chemo with APC    All of the patient's questions were answered to their satisfaction. They will return to clinic as above.    Charleen BEARD scribed the services personally performed by Keyla Abarca DO.     The documentation recorded by the scribe accurately and completely reflects the service(s) I personally performed and the decisions made by me.     Keyla Abarca DO  [facility]    Note to patient: The 21st Century Cures Act makes medical notes like these available to patients in the interest of transparency. However, be advised this is a medical document. It is intended as peer to peer communication. It is written in medical language and may contain abbreviations or verbiage that are unfamiliar. It may appear blunt or direct. Medical documents are intended to carry relevant information, facts as evident, and the clinical opinion of the practitioner.        numerical 0-10 Citizen of [place]

## 2024-12-20 ENCOUNTER — NON-APPOINTMENT (OUTPATIENT)
Age: 44
End: 2024-12-20
